# Patient Record
Sex: FEMALE | Race: BLACK OR AFRICAN AMERICAN | NOT HISPANIC OR LATINO | Employment: FULL TIME | URBAN - METROPOLITAN AREA
[De-identification: names, ages, dates, MRNs, and addresses within clinical notes are randomized per-mention and may not be internally consistent; named-entity substitution may affect disease eponyms.]

---

## 2017-10-11 ENCOUNTER — HOSPITAL ENCOUNTER (OUTPATIENT)
Dept: OBSTETRICS AND GYNECOLOGY | Facility: HOSPITAL | Age: 22
End: 2017-10-11
Attending: SPECIALIST | Admitting: SPECIALIST

## 2017-10-17 ENCOUNTER — HISTORICAL (OUTPATIENT)
Dept: LAB | Facility: HOSPITAL | Age: 22
End: 2017-10-17

## 2021-09-13 ENCOUNTER — HISTORICAL (OUTPATIENT)
Dept: ADMINISTRATIVE | Facility: HOSPITAL | Age: 26
End: 2021-09-13

## 2021-09-13 LAB
HAV IGM SERPL QL IA: NONREACTIVE
HBV CORE IGM SERPL QL IA: NONREACTIVE
HBV SURFACE AG SERPL QL IA: NONREACTIVE
HCV AB SERPL QL IA: NONREACTIVE
HIV 1+2 AB+HIV1 P24 AG SERPL QL IA: NONREACTIVE
T PALLIDUM AB SER QL: NONREACTIVE

## 2022-04-07 ENCOUNTER — HISTORICAL (OUTPATIENT)
Dept: ADMINISTRATIVE | Facility: HOSPITAL | Age: 27
End: 2022-04-07
Payer: MEDICAID

## 2022-04-23 VITALS
WEIGHT: 178.38 LBS | SYSTOLIC BLOOD PRESSURE: 127 MMHG | HEIGHT: 60 IN | BODY MASS INDEX: 35.02 KG/M2 | OXYGEN SATURATION: 100 % | DIASTOLIC BLOOD PRESSURE: 84 MMHG

## 2022-05-02 NOTE — HISTORICAL [facility]
This is a historical note converted from [employer]. Formatting and pictures may have been removed.  Please reference [employer] for original formatting and attached multimedia. Chief Complaint  1 year f/u, pap x 1 year ago  History of Present Illness  26 y.o.? presenting for an annual exam. She has a history of AUB and?cervical dysplasia as below.  Regarding AUB, reports improvement in bleeding profile. Menses are now regular lasting 5 days, heavy bleeding only for the first 2 days.  ?  Abnormal GYN cytology Hx:   ASCUS/HPV +   LSIL  2016 LSIL  2016 CINI/ ECC BENIGN   LSIL/HPV NEG  2018 ASCUS  2019: Colpo: 12:00 benign; ECC scant, insufficient  : NIL HPV negative  Review of Systems  Constitutional:?no fever  Respiratory:?no shortness of breath  Cardiovascular:?no chest pain  Gastrointestinal:?no nausea, vomiting, or diarrhea. No abdominal pain  Genitourinary:?no dysuria, no urinary frequency or urgency, no hematuria  Musculoskeletal: no muscle or joint pain, no joint swelling  Physical Exam  Vitals & Measurements  T:?36.9? ?C (Oral)? HR:?73(Peripheral)? RR:?18? BP:?127/84?  HT:?152.00?cm? WT:?80.900?kg? BMI:?35.02? LMP:?2021 00:00 CDT?  General: NAD, AAOx3. Well appearing.?  Respiratory: CTAB, no wheezes, rales, or rhonchi  Cardiovascular: RRR no murmurs, rubs, or gallops  Breast: symmetric, no skin changes, no masses palpated, no supraclavicular or axillary lymphadenopathy, nipples aligned with no discharge/blood/inversion  Abdomen: soft, nondistended, nontender to palpation, no masses palpated, normoactive bowel sounds  Incisions: well-healed Pfannenstiel incision  Extremities: no edema, no calf tenderness  External genitalia: Normal female anatomy, no masses/lesions.  Speculum exam: vaginal mucosa normal in appearance. Pink. Cervix well visualized with no masses or lesions.  Bimanual exam: Uterus Size: 8-10 cm. Minimal Capacity, descent,?and mobility. No adnexal  fullness/tenderness.  Assessment/Plan  1.?Womens annual routine gynecological examination?Z01.419  ?Follow up in one year with GYN clinic. Discuss Female Contraception.  ?  Ordered:  Chlam trachom & N gonorrhoeae by [facility] [id number], Routine collect, Cervical, 21 10:57:00 CDT, Stop date 21 10:57:00 CDT, Nurse collect, Routine screening for STI (sexually transmitted infection)  Womens annual routine gynecological examination, 21 10:57:00 CDT  Clinic Follow Up Telemed Office, *Est. 22 3:00:00 CDT, Order for future visit, Womens annual routine gynecological examination  ?  2.?Routine screening for STI (sexually transmitted infection)?Z11.3  Labs and swabs collected today  ?  Ordered:  Chlam trachom & N gonorrhoeae by [facility] [id number], Routine collect, Cervical, 21 10:57:00 CDT, Stop date 21 10:57:00 CDT, Nurse collect, Routine screening for STI (sexually transmitted infection)  Womens annual routine gynecological examination, 21 10:57:00 CDT  ?  3.?History of cervical dysplasia?Z87.410  No abnormal findings on exam. Pap smear up to date/  ?  4.?Obesity?E66.9  Counseled patient on benefits of exercise and diet. Informed patients that 30 minutes moderate intensity exercise five time a week is recommended for women.  ?  Patient seen and examined by Mane Ndiaye and Dr. Espinoza.  ?  Reviewed the patient?s medical history, resident?s findings on physical exam, and the diagnosis with treatment plan. Care provided was reasonable and necessary.  ?  Miesha Shepard MD   Problem List/Past Medical History  Ongoing  General counseling and advice on female contraception  Obesity  Screening for malignant neoplasm of cervix  Historical  Anemia  Contraception management  Procedure/Surgical History   Section (None) (2017)  Extraction of Products of Conception, Low Cervical, Open Approach (2017)   section (2013)   section (2008)    Medications  ferrous gluconate 324 mg (38 mg elemental iron) oral tablet, 324 mg= 1 tab(s), Oral, Daily  Allergies  No Known Medication Allergies  Social History  Abuse/Neglect  No, No, 09/02/2020  Alcohol  Past, 07/11/2021  Never, 08/12/2016  Employment/School  Employed, 07/12/2019  Exercise  Exercise duration: 0., 07/12/2019  Home/Environment  Lives with Children. Living situation: Home/Independent., 07/12/2019  Nutrition/Health  Regular, 07/12/2019  Sexual  Sexually active: Yes. Number of current partners 1. Sexual orientation: Straight or heterosexual. Gender Identity Identifies as female., 09/02/2020  Substance Use  Never, 07/11/2021  Never, 08/12/2016  Tobacco  Never (less than 100 in lifetime), N/A, 09/02/2020  Family History  Abnormal uterine bleeding..: Mother.  Ovarian cyst: Mother.

## 2022-06-20 ENCOUNTER — HOSPITAL ENCOUNTER (INPATIENT)
Facility: HOSPITAL | Age: 27
LOS: 3 days | Discharge: HOME | DRG: 378 | End: 2022-06-23
Attending: EMERGENCY MEDICINE | Admitting: INTERNAL MEDICINE
Payer: MEDICAID

## 2022-06-20 DIAGNOSIS — R19.5 OCCULT BLOOD POSITIVE STOOL: ICD-10-CM

## 2022-06-20 DIAGNOSIS — D64.9 SYMPTOMATIC ANEMIA: Primary | ICD-10-CM

## 2022-06-20 PROCEDURE — 11000001 HC ACUTE MED/SURG PRIVATE ROOM

## 2022-06-20 PROCEDURE — 99285 EMERGENCY DEPT VISIT HI MDM: CPT | Mod: 25

## 2022-06-20 NOTE — Clinical Note
Diagnosis: Symptomatic anemia [9028949]   Admitting Provider:: NANCY PAREDES [[id number]]   Future Attending Provider: NANCY PAREDES [[id number]]   Reason for IP Medical Treatment  (Clinical interventions that can only be accomplished in the IP setting? ) :: blood, gi   Estimated Length of Stay:: 2 midnights   I certify that Inpatient services for greater than or equal to 2 midnights are medically necessary:: Yes   Plans for Post-Acute care--if anticipated (pick the single best option):: A. No post acute care anticipated at this time

## 2022-06-21 ENCOUNTER — ANESTHESIA EVENT (OUTPATIENT)
Dept: ENDOSCOPY | Facility: HOSPITAL | Age: 27
DRG: 378 | End: 2022-06-21
Payer: MEDICAID

## 2022-06-21 ENCOUNTER — ANESTHESIA (OUTPATIENT)
Dept: ENDOSCOPY | Facility: HOSPITAL | Age: 27
DRG: 378 | End: 2022-06-21
Payer: MEDICAID

## 2022-06-21 PROBLEM — D62 ACUTE BLOOD LOSS ANEMIA: Status: ACTIVE | Noted: 2022-06-21

## 2022-06-21 LAB
ABO + RH BLD: NORMAL
ABO + RH BLD: NORMAL
ABORH RETYPE: NORMAL
B-HCG UR QL: NEGATIVE
BLD PROD TYP BPU: NORMAL
BLD PROD TYP BPU: NORMAL
BLOOD UNIT EXPIRATION DATE: NORMAL
BLOOD UNIT EXPIRATION DATE: NORMAL
BLOOD UNIT TYPE CODE: 7300
BLOOD UNIT TYPE CODE: 7300
CROSSMATCH INTERPRETATION: NORMAL
CROSSMATCH INTERPRETATION: NORMAL
CTP QC/QA: YES
DISPENSE STATUS: NORMAL
DISPENSE STATUS: NORMAL
GROUP & RH: NORMAL
HCT VFR BLD AUTO: 24.8 % (ref 37–47)
HCT VFR BLD AUTO: 33 % (ref 37–47)
HCT VFR BLD AUTO: 36.6 % (ref 37–47)
HGB BLD-MCNC: 10.9 GM/DL (ref 12–16)
HGB BLD-MCNC: 6.5 GM/DL (ref 12–16)
HGB BLD-MCNC: 9.7 GM/DL (ref 12–16)
INDIRECT COOMBS GEL: NORMAL
SARS-COV-2 RDRP RESP QL NAA+PROBE: NEGATIVE
UNIT NUMBER: NORMAL
UNIT NUMBER: NORMAL

## 2022-06-21 PROCEDURE — 25000003 PHARM REV CODE 250: Performed by: ANESTHESIOLOGY

## 2022-06-21 PROCEDURE — 37000008 HC ANESTHESIA 1ST 15 MINUTES: Performed by: INTERNAL MEDICINE

## 2022-06-21 PROCEDURE — 27201423 OPTIME MED/SURG SUP & DEVICES STERILE SUPPLY: Performed by: INTERNAL MEDICINE

## 2022-06-21 PROCEDURE — 63600175 PHARM REV CODE 636 W HCPCS: Performed by: EMERGENCY MEDICINE

## 2022-06-21 PROCEDURE — 43202 ESOPHAGOSCOPY FLEX BIOPSY: CPT | Performed by: INTERNAL MEDICINE

## 2022-06-21 PROCEDURE — 25000003 PHARM REV CODE 250: Performed by: EMERGENCY MEDICINE

## 2022-06-21 PROCEDURE — 25000003 PHARM REV CODE 250: Performed by: INTERNAL MEDICINE

## 2022-06-21 PROCEDURE — C9113 INJ PANTOPRAZOLE SODIUM, VIA: HCPCS | Performed by: EMERGENCY MEDICINE

## 2022-06-21 PROCEDURE — 36415 COLL VENOUS BLD VENIPUNCTURE: CPT | Performed by: EMERGENCY MEDICINE

## 2022-06-21 PROCEDURE — 11000001 HC ACUTE MED/SURG PRIVATE ROOM

## 2022-06-21 PROCEDURE — 86850 RBC ANTIBODY SCREEN: CPT | Performed by: EMERGENCY MEDICINE

## 2022-06-21 PROCEDURE — 63600175 PHARM REV CODE 636 W HCPCS: Performed by: NURSE ANESTHETIST, CERTIFIED REGISTERED

## 2022-06-21 PROCEDURE — 86920 COMPATIBILITY TEST SPIN: CPT | Performed by: EMERGENCY MEDICINE

## 2022-06-21 PROCEDURE — 81025 URINE PREGNANCY TEST: CPT | Performed by: ANESTHESIOLOGY

## 2022-06-21 PROCEDURE — 36430 TRANSFUSION BLD/BLD COMPNT: CPT

## 2022-06-21 PROCEDURE — 87635 SARS-COV-2 COVID-19 AMP PRB: CPT | Performed by: INTERNAL MEDICINE

## 2022-06-21 PROCEDURE — 85014 HEMATOCRIT: CPT | Performed by: EMERGENCY MEDICINE

## 2022-06-21 PROCEDURE — P9016 RBC LEUKOCYTES REDUCED: HCPCS | Performed by: EMERGENCY MEDICINE

## 2022-06-21 PROCEDURE — 37000009 HC ANESTHESIA EA ADD 15 MINS: Performed by: INTERNAL MEDICINE

## 2022-06-21 PROCEDURE — 25000003 PHARM REV CODE 250: Performed by: NURSE ANESTHETIST, CERTIFIED REGISTERED

## 2022-06-21 RX ORDER — PANTOPRAZOLE SODIUM 40 MG/10ML
INJECTION, POWDER, LYOPHILIZED, FOR SOLUTION INTRAVENOUS
Status: DISPENSED
Start: 2022-06-21 | End: 2022-06-21

## 2022-06-21 RX ORDER — ONDANSETRON 2 MG/ML
4 INJECTION INTRAMUSCULAR; INTRAVENOUS EVERY 8 HOURS PRN
Status: DISCONTINUED | OUTPATIENT
Start: 2022-06-21 | End: 2022-06-23 | Stop reason: HOSPADM

## 2022-06-21 RX ORDER — SODIUM CHLORIDE 0.9 % (FLUSH) 0.9 %
10 SYRINGE (ML) INJECTION
Status: DISCONTINUED | OUTPATIENT
Start: 2022-06-21 | End: 2022-06-23 | Stop reason: HOSPADM

## 2022-06-21 RX ORDER — SODIUM, POTASSIUM,MAG SULFATES 17.5-3.13G
177 SOLUTION, RECONSTITUTED, ORAL ORAL 2 TIMES DAILY
Status: COMPLETED | OUTPATIENT
Start: 2022-06-21 | End: 2022-06-22

## 2022-06-21 RX ORDER — TALC
6 POWDER (GRAM) TOPICAL NIGHTLY PRN
Status: DISCONTINUED | OUTPATIENT
Start: 2022-06-21 | End: 2022-06-23 | Stop reason: HOSPADM

## 2022-06-21 RX ORDER — HYDROCODONE BITARTRATE AND ACETAMINOPHEN 500; 5 MG/1; MG/1
TABLET ORAL
Status: DISCONTINUED | OUTPATIENT
Start: 2022-06-21 | End: 2022-06-23 | Stop reason: HOSPADM

## 2022-06-21 RX ORDER — LIDOCAINE HYDROCHLORIDE 10 MG/ML
1 INJECTION, SOLUTION EPIDURAL; INFILTRATION; INTRACAUDAL; PERINEURAL ONCE
Status: COMPLETED | OUTPATIENT
Start: 2022-06-21 | End: 2022-06-21

## 2022-06-21 RX ORDER — SODIUM CHLORIDE 9 MG/ML
INJECTION, SOLUTION INTRAVENOUS CONTINUOUS
Status: DISCONTINUED | OUTPATIENT
Start: 2022-06-21 | End: 2022-06-23 | Stop reason: HOSPADM

## 2022-06-21 RX ORDER — LIDOCAINE HYDROCHLORIDE 10 MG/ML
INJECTION, SOLUTION EPIDURAL; INFILTRATION; INTRACAUDAL; PERINEURAL
Status: COMPLETED
Start: 2022-06-21 | End: 2022-06-21

## 2022-06-21 RX ORDER — PROPOFOL 10 MG/ML
VIAL (ML) INTRAVENOUS
Status: DISCONTINUED | OUTPATIENT
Start: 2022-06-21 | End: 2022-06-21

## 2022-06-21 RX ORDER — PROPOFOL 10 MG/ML
VIAL (ML) INTRAVENOUS
Status: COMPLETED
Start: 2022-06-21 | End: 2022-06-21

## 2022-06-21 RX ORDER — PANTOPRAZOLE SODIUM 40 MG/10ML
40 INJECTION, POWDER, LYOPHILIZED, FOR SOLUTION INTRAVENOUS
Status: COMPLETED | OUTPATIENT
Start: 2022-06-21 | End: 2022-06-21

## 2022-06-21 RX ADMIN — PROPOFOL 50 MG: 10 INJECTION, EMULSION INTRAVENOUS at 12:06

## 2022-06-21 RX ADMIN — LIDOCAINE HYDROCHLORIDE 5 ML: 10 INJECTION, SOLUTION EPIDURAL; INFILTRATION; INTRACAUDAL; PERINEURAL at 12:06

## 2022-06-21 RX ADMIN — PROPOFOL 150 MG: 10 INJECTION, EMULSION INTRAVENOUS at 12:06

## 2022-06-21 RX ADMIN — SODIUM CHLORIDE: 9 INJECTION, SOLUTION INTRAVENOUS at 09:06

## 2022-06-21 RX ADMIN — SODIUM SULFATE, POTASSIUM SULFATE, MAGNESIUM SULFATE 177 ML: 17.5; 3.13; 1.6 SOLUTION, CONCENTRATE ORAL at 06:06

## 2022-06-21 RX ADMIN — PANTOPRAZOLE SODIUM 40 MG: 40 INJECTION, POWDER, LYOPHILIZED, FOR SOLUTION INTRAVENOUS at 03:06

## 2022-06-21 RX ADMIN — SODIUM CHLORIDE, SODIUM GLUCONATE, SODIUM ACETATE, POTASSIUM CHLORIDE AND MAGNESIUM CHLORIDE: 526; 502; 368; 37; 30 INJECTION, SOLUTION INTRAVENOUS at 12:06

## 2022-06-21 NOTE — H&P
[facility] - Emergency Dept    History & Physical      Patient Name: Alexa Gonzales  MRN: [id number]  Admission Date: 6/20/2022  Attending Physician: Vladimir Bermudez MD   Primary Care Provider: JOSE Tolliver         Patient information was obtained from patient and ER records.     Subjective:     Principal Problem: Low hemoglobin.    Chief Complaint:   Chief Complaint   Patient presents with    GI Problem     Pt arrives via [facility], pt is transfer from [facility], Pt has a GI bleed.         HPI: This is a patient that is followed by nurse practitioner Iesha Thomas. Patient is now rates to me that this past Saturday, June 18 she was contacted via telephone by her nurse practitioner advising her that her hemoglobin was 6 that she should immediately go to the emergency room at [facility].  The patient did not follow her instructions until last night because she told me that she had things to do.  At [facility] the OR and GI sweets are closed due to equipment failure and replacement and the patient was transferred to [facility].  Patient tells me that she has been anemic for a very long time, years.  She has never had a GI workup.  And she states that she does not have heavy periods.  She states that she has had blood transfusions in the past ×2 events.  Patient does not feel weak or faint C.  No use of NSAIDs or any other type of blood thinners.  Patient states that she had a CAT scan of the abdomen and pelvis at the emergency room at [facility] and she was told that those results were normal.  Plan he was to transfuse her primary Consul GI and see if they want to work her up as an outpatient or proceed with scopes. Of importance to note is that patient is covered in tattoos.    Past Medical History:   Diagnosis Date    Anemia, unspecified        History reviewed. No pertinent surgical history.    Review of patient's allergies indicates:  No Known Allergies    No  current facility-administered medications on file prior to encounter.     No current outpatient medications on file prior to encounter.     Family History    None       Tobacco Use    Smoking status: Not on file    Smokeless tobacco: Not on file   Substance and Sexual Activity    Alcohol use: Not on file    Drug use: Not on file    Sexual activity: Not on file     Review of Systems   Constitutional: Negative.    HENT: Negative.    Eyes: Negative.    Respiratory: Negative.    Cardiovascular: Negative.    Gastrointestinal: Positive for blood in stool.   Endocrine: Negative.    Genitourinary: Negative.    Musculoskeletal: Negative.    Skin: Negative.    Allergic/Immunologic: Negative.    Neurological: Negative.    Hematological: Negative.    Psychiatric/Behavioral: Negative.    All other systems reviewed and are negative.    Objective:     Vital Signs (Most Recent):  Temp: 98.4 °F (36.9 °C) (06/21/22 0739)  Pulse: (!) 59 (06/21/22 0739)  Resp: (!) 22 (06/21/22 0739)  BP: 130/77 (06/21/22 0739)  SpO2: 100 % (06/21/22 0739) Vital Signs (24h Range):  Temp:  [98 °F (36.7 °C)-98.6 °F (37 °C)] 98.4 °F (36.9 °C)  Pulse:  [58-91] 59  Resp:  [13-24] 22  SpO2:  [98 %-100 %] 100 %  BP: (103-144)/(60-84) 130/77     Weight: 77.1 kg (170 lb)  Body mass index is 33.2 kg/m².    Physical Exam  Vitals and nursing note reviewed.   Constitutional:       Appearance: Normal appearance. She is obese.   HENT:      Head: Normocephalic and atraumatic.      Right Ear: External ear normal.      Left Ear: External ear normal.      Nose: Nose normal.      Mouth/Throat:      Mouth: Mucous membranes are moist.      Pharynx: Oropharynx is clear.   Eyes:      Extraocular Movements: Extraocular movements intact.      Conjunctiva/sclera: Conjunctivae normal.      Pupils: Pupils are equal, round, and reactive to light.   Cardiovascular:      Rate and Rhythm: Normal rate and regular rhythm.      Pulses: Normal pulses.      Heart sounds: Normal heart  sounds.   Pulmonary:      Effort: Pulmonary effort is normal.      Breath sounds: Normal breath sounds.   Abdominal:      General: Abdomen is flat. Bowel sounds are normal.      Palpations: Abdomen is soft.   Musculoskeletal:         General: Normal range of motion.      Cervical back: Normal range of motion and neck supple.   Skin:     General: Skin is warm and dry.   Neurological:      General: No focal deficit present.      Mental Status: She is alert and oriented to person, place, and time.   Psychiatric:         Mood and Affect: Mood normal.         Behavior: Behavior normal.         Thought Content: Thought content normal.           CRANIAL NERVES     CN III, IV, VI   Pupils are equal, round, and reactive to light.      Significant Labs:   All pertinent labs within the past 24 hours have been reviewed.  CBC:   Recent Labs   Lab 06/21/22  0037   HGB 6.5*   HCT 24.8*       Significant Imaging: I have reviewed all pertinent imaging results/findings within the past 24 hours.  CT: I have reviewed all pertinent results/findings within the past 24 hours and my personal findings are:  not available for me to review at the moment.    Assessment/Plan:     There are no hospital problems to display for this patient.    VTE Risk Mitigation (From admission, onward)         Ordered     IP VTE HIGH RISK PATIENT  Once         06/21/22 0411     Place sequential compression device  Until discontinued         06/21/22 0411            Assessment is patient with history of chronic anemia for a long time which at the moment I cannot determine since the patient states has just been a long time.  She is basically asymptomatic.  The sense of urgency, Chasidy by the primary care nurse practitioner is not reflected in the patient's behavior and demeanor.  She has not had any type of workup.  She states that she is awaiting a GI referral.  I will see we can coordinate some type of complete workup on this patient.  I will consult with GI to  see what a dural recommendations are.  She will be transfused with a couple of units of blood.  We could do some iron studies but it seems like she has chronic GI bleed could be from an AVM which may necessitate bleeding scan or camera study.      Vladimir Bermudez MD  Department of Hospital Medicine   [facility] - Emergency Dept

## 2022-06-21 NOTE — PROGRESS NOTES
Attempted to meet with patient to conduct initial cm dc planning assessment but patient in GI lab.   no

## 2022-06-21 NOTE — CONSULTS
"Gastroenterology Consult    Reason for Consult:     Acute on chronic anemia, melena    Hospital Day:0     HPI:  Alexa Gonzales is a 27 y.o. female unknown to our group w/ pmhx of chronic anemia presenting to the ED as transfer from [facility], directed by her PCP Iesha Thomas NP for low H&H on outpatient labs. Hgb is currently 6.5. Pt went to [facility] ED last night where CT abd/pelv w/ contrast was done 6/20/22 and was reportedly unremarkable. She was transferred here for GI workup. Pt reports a hx of chronic anemia managed by her PCP with iron supplementation. Reports significant blood clots with her menstrual cycles, but she doesn't describe them as "heavy." Her stool is occasionally darker in color, but usually brown. No NSAIDs, anticoags, or alcohol. She reports that she only has BMs 2-3 x/mo and sometimes strains when having them, causing rectal pain and scant blood on the toilet paper but she denies significant hematochezia. No recent weight loss, n/v/d, dysphagia, abd pain, or other GI complaints.    abd surgeries include only c-sections. Naive to endoscopy. Family hx positive for colon and esophageal cancer in her grandparent. No first degree relatives with known GI cancer/disease.     VSS, Lab work reviewed from [facility] trans and are unremarkable. She is receiving 1 unit of prbcs now.      PCP:  JOSE Tolliver    Review of patient's allergies indicates:  No Known Allergies    Current Facility-Administered Medications   Medication Dose Route Frequency Provider Last Rate Last Admin    pantoprazole (PROTONIX) 40 mg injection             0.9%  NaCl infusion (for blood administration)   Intravenous Q24H PRN Aye Rand MD        0.9%  NaCl infusion   Intravenous Continuous Aye Rand MD        melatonin tablet 6 mg  6 mg Oral Nightly PRN Aye Rand MD        ondansetron injection 4 mg  4 mg Intravenous Q8H PRN Aye Rand MD        sodium chloride 0.9% flush 10 mL  10 mL Intravenous " NELSON Rand MD         No current outpatient medications on file.     (Not in a hospital admission)      Past Medical History:  Past Medical History:   Diagnosis Date    Anemia, unspecified        Past Surgical History:  History reviewed. No pertinent surgical history.    Family History:  History reviewed. No pertinent family history.    Social History:  Social History     Tobacco Use    Smoking status: Not on file    Smokeless tobacco: Not on file   Substance Use Topics    Alcohol use: Not on file           Review of Systems:    Review of Systems   Constitutional: Negative for fever and unexpected weight change.   Respiratory: Negative for cough and shortness of breath.    Cardiovascular: Negative for chest pain and leg swelling.   Gastrointestinal: Positive for anal bleeding, blood in stool and constipation. Negative for abdominal pain, diarrhea, nausea and vomiting.   Musculoskeletal: Negative for back pain and myalgias.   Skin: Negative for color change and pallor.   Neurological: Negative for speech difficulty and weakness.   All other systems reviewed and are negative.      Objective:      VITAL SIGNS: 24 HR MIN & MAX LAST  Temp  Min: 98 °F (36.7 °C)  Max: 98.6 °F (37 °C) 98.4 °F (36.9 °C)  BP  Min: 103/83  Max: 144/84 121/85  Pulse  Min: 58  Max: 91 60  Resp  Min: 13  Max: 25 (!) 25  SpO2  Min: 98 %  Max: 100 % 100 %      Intake/Output Summary (Last 24 hours) at 6/21/2022 0852  Last data filed at 6/21/2022 0724  Gross per 24 hour   Intake 600 ml   Output --   Net 600 ml       Physical Exam  Vitals and nursing note reviewed.   Constitutional:       Appearance: Normal appearance.   HENT:      Head: Normocephalic and atraumatic.   Eyes:      General: No scleral icterus.     Extraocular Movements: Extraocular movements intact.   Cardiovascular:      Rate and Rhythm: Normal rate and regular rhythm.      Heart sounds: Normal heart sounds.   Pulmonary:      Effort: Pulmonary effort is normal. No  respiratory distress.      Breath sounds: Normal breath sounds.   Abdominal:      General: Abdomen is flat. Bowel sounds are normal. There is no distension.      Palpations: Abdomen is soft.      Tenderness: There is no abdominal tenderness.   Musculoskeletal:         General: No swelling or deformity. Normal range of motion.      Right lower leg: No edema.      Left lower leg: No edema.   Skin:     General: Skin is warm and dry.   Neurological:      General: No focal deficit present.      Mental Status: She is alert and oriented to person, place, and time.   Psychiatric:         Mood and Affect: Mood normal.         Behavior: Behavior normal.         Recent Results (from the past 48 hour(s))   Type & Screen    Collection Time: 06/21/22 12:37 AM   Result Value Ref Range    Group & Rh B POS     Indirect Sincere GEL NEG    Hemoglobin and Hematocrit    Collection Time: 06/21/22 12:37 AM   Result Value Ref Range    Hgb 6.5 (L) 12.0 - 16.0 gm/dL    Hct 24.8 (L) 37.0 - 47.0 %   Prepare RBC 2 Units; symptomatic anemia    Collection Time: 06/21/22 12:37 AM   Result Value Ref Range    UNIT NUMBER [id number]     UNIT ABO/RH B POS     DISPENSE STATUS Issued     Unit Expiration [id number]     Product Code F7651T97     Unit Blood Type Code 7300     CROSSMATCH INTERPRETATION Compatible     UNIT NUMBER [id number]     UNIT ABO/RH B POS     DISPENSE STATUS Issued     Unit Expiration [id number]     Product Code W8034S80     Unit Blood Type Code 7300     CROSSMATCH INTERPRETATION Compatible    ABORH RETYPE    Collection Time: 06/21/22  2:20 AM   Result Value Ref Range    ABORH Retype B POS        No results found.      Assessment & Plan:  28 y/o female unknown to our group with pmhx of chronic anemia admitted with low H&H and reports of dark stool.    1. Acute on chronic anemia  - long hx of chronic anemia requiring transfusions. Questionably heavy menstrual cycles.  - on iron supplementation.  - hgb 6.5 --- receiving 1 unit  prbcs now.     2. Melena?  - unsure if the dark stool is due to iron supplementation  - no associated pain, NSAID use   - obtain FOBT    -EGD today. Afterwards, clear liquids for the rest of the day, prep tonight and probable colonoscopy tomorrow.    Thank you for allowing us to participate in this patient's care.    Agueda Staples PA-C  [facility], [facility]

## 2022-06-21 NOTE — INTERVAL H&P NOTE
The patient has been examined and the H&P  has not changed since consultation.    LILA CAMPO          Active Hospital Problems    Diagnosis  POA    *Acute blood loss anemia [D62]  Unknown      Resolved Hospital Problems   No resolved problems to display.

## 2022-06-21 NOTE — PROGRESS NOTES
Gastroenterology Progress Note    Subjective/Interval History:  Iron deficiency anemia of unknown etiology    ROS:    ROS      Vital Signs:  /88   Pulse 65   Temp 98.4 °F (36.9 °C)   Resp 18   Ht 5' (1.524 m)   Wt 77.1 kg (170 lb)   SpO2 100%   BMI 33.20 kg/m²   Body mass index is 33.2 kg/m².    Physical Exam:    Physical Exam    Labs:  Recent Results (from the past 48 hour(s))   Type & Screen    Collection Time: 06/21/22 12:37 AM   Result Value Ref Range    Group & Rh B POS     Indirect Sincere GEL NEG    Hemoglobin and Hematocrit    Collection Time: 06/21/22 12:37 AM   Result Value Ref Range    Hgb 6.5 (L) 12.0 - 16.0 gm/dL    Hct 24.8 (L) 37.0 - 47.0 %   Prepare RBC 2 Units; symptomatic anemia    Collection Time: 06/21/22 12:37 AM   Result Value Ref Range    UNIT NUMBER [id number]     UNIT ABO/RH B POS     DISPENSE STATUS Issued     Unit Expiration [id number]     Product Code B9130E87     Unit Blood Type Code 7300     CROSSMATCH INTERPRETATION Compatible     UNIT NUMBER [id number]     UNIT ABO/RH B POS     DISPENSE STATUS Issued     Unit Expiration [id number]     Product Code [id number]     Unit Blood Type Code 7300     CROSSMATCH INTERPRETATION Compatible    ABORH RETYPE    Collection Time: 06/21/22  2:20 AM   Result Value Ref Range    ABORH Retype B POS    COVID-19 Rapid Screening    Collection Time: 06/21/22  9:51 AM   Result Value Ref Range    SARS COV-2 MOLECULAR Negative Negative   POCT urine pregnancy    Collection Time: 06/21/22 12:11 PM   Result Value Ref Range    POC Preg Test, Ur Negative Negative     Acceptable Yes          Assessment/Plan:  EGD today revealed no source of the blood loss GI wise.  Patient will undergo colonoscopy tomorrow with family history of colon cancer unexplained blood loss anemia.  The possibility still exists that this could be combination of childbirths over the last 5 years and menstrual losses.  She however has been on Depo-Provera and has  had very few periods capsule endoscopy will also be considered.       TRACEY Kuhn

## 2022-06-21 NOTE — ANESTHESIA POSTPROCEDURE EVALUATION
Anesthesia Post Evaluation    Patient: Alexa Gonzales    Procedure(s) Performed: Procedure(s) (LRB):  EGD (ESOPHAGOGASTRODUODENOSCOPY) (N/A)  EGD, WITH CLOSED BIOPSY    Final Anesthesia Type: MAC      Patient location during evaluation: GI PACU  Patient participation: Yes- Able to Participate  Level of consciousness: awake and alert  Post-procedure vital signs: reviewed and stable  Pain management: adequate  Airway patency: patent    PONV status at discharge: No PONV  Anesthetic complications: no      Cardiovascular status: blood pressure returned to baseline  Respiratory status: spontaneous ventilation and unassisted  Hydration status: euvolemic  Follow-up not needed.          Vitals Value Taken Time   /88 06/21/22 1248   Temp 98 06/21/22 1353   Pulse 65 06/21/22 1248   Resp 18 06/21/22 1248   SpO2 100 % 06/21/22 1248         No case tracking events are documented in the log.      Pain/Daniel Score: Daniel Score: 10 (6/21/2022 12:48 PM)

## 2022-06-21 NOTE — H&P (VIEW-ONLY)
"Gastroenterology Consult    Reason for Consult:     Acute on chronic anemia, melena    Hospital Day:0     HPI:  Alexa Gonzales is a 27 y.o. female unknown to our group w/ pmhx of chronic anemia presenting to the ED [place] [facility] [place] directed by her PCP Iesha Thomas NP for low H&H on outpatient labs. Hgb is currently 6.5. Pt went [place] [facility] [place] where CT abd/pelv w/ contrast was done 6/20/22 and was reportedly unremarkable. She was transferred here for GI workup. Pt reports a hx of chronic anemia managed by her PCP with iron supplementation. Reports significant blood clots with her menstrual cycles, but she doesn't describe them as "heavy." Her stool is occasionally darker in color, but usually brown. No NSAIDs, anticoags, or alcohol. She reports that she only has BMs 2-3 x/mo and sometimes strains when having them, causing rectal pain and scant blood on the toilet paper but she denies significant hematochezia. No recent weight loss, n/v/d, dysphagia, abd pain, or other GI complaints.    abd surgeries include only c-sections. Naive to endoscopy. Family hx positive for colon and esophageal cancer in her grandparent. No first degree relatives with known GI cancer/disease.     VSS, Lab work reviewed from [facility] [place] and are unremarkable. She is receiving 1 unit of prbcs now.      PCP:  JOSE Tolliver    Review of patient's allergies indicates:  No Known Allergies    Current Facility-Administered Medications   Medication Dose Route Frequency Provider Last Rate Last Admin    pantoprazole (PROTONIX) 40 mg injection             0.9%  NaCl infusion (for blood administration)   Intravenous Q24H PRN Aye Rand MD        0.9%  NaCl infusion   Intravenous Continuous Aye Rand MD        melatonin tablet 6 mg  6 mg Oral Nightly PRN Aye Rand MD        ondansetron injection 4 mg  4 mg Intravenous Q8H PRN Aye Rand MD        sodium chloride 0.9% flush 10 mL  10 mL Intravenous " NELSON Rand MD         No current outpatient medications on file.     (Not in a hospital admission)      Past Medical History:  Past Medical History:   Diagnosis Date    Anemia, unspecified        Past Surgical History:  History reviewed. No pertinent surgical history.    Family History:  History reviewed. No pertinent family history.    Social History:  Social History     Tobacco Use    Smoking status: Not on file    Smokeless tobacco: Not on file   Substance Use Topics    Alcohol use: Not on file           Review of Systems:    Review of Systems   Constitutional: Negative for fever and unexpected weight change.   Respiratory: Negative for cough and shortness of breath.    Cardiovascular: Negative for chest pain and leg swelling.   Gastrointestinal: Positive for anal bleeding, blood in stool and constipation. Negative for abdominal pain, diarrhea, nausea and vomiting.   Musculoskeletal: Negative for back pain and myalgias.   Skin: Negative for color change and pallor.   Neurological: Negative for speech difficulty and weakness.   All other systems reviewed and are negative.      Objective:      VITAL SIGNS: 24 HR MIN & MAX LAST  Temp  Min: 98 °F (36.7 °C)  Max: 98.6 °F (37 °C) 98.4 °F (36.9 °C)  BP  Min: 103/83  Max: 144/84 121/85  Pulse  Min: 58  Max: 91 60  Resp  Min: 13  Max: 25 (!) 25  SpO2  Min: 98 %  Max: 100 % 100 %      Intake/Output Summary (Last 24 hours) at 6/21/2022 0852  Last data filed at 6/21/2022 0724  Gross per 24 hour   Intake 600 ml   Output --   Net 600 ml       Physical Exam  Vitals and nursing note reviewed.   Constitutional:       Appearance: Normal appearance.   HENT:      Head: Normocephalic and atraumatic.   Eyes:      General: No scleral icterus.     Extraocular Movements: Extraocular movements intact.   Cardiovascular:      Rate and Rhythm: Normal rate and regular rhythm.      Heart sounds: Normal heart sounds.   Pulmonary:      Effort: Pulmonary effort is normal. No  respiratory distress.      Breath sounds: Normal breath sounds.   Abdominal:      General: Abdomen is flat. Bowel sounds are normal. There is no distension.      Palpations: Abdomen is soft.      Tenderness: There is no abdominal tenderness.   Musculoskeletal:         General: No swelling or deformity. Normal range of motion.      Right lower leg: No edema.      Left lower leg: No edema.   Skin:     General: Skin is warm and dry.   Neurological:      General: No focal deficit present.      Mental Status: She is alert and oriented to person, place, and time.   Psychiatric:         Mood and Affect: Mood normal.         Behavior: Behavior normal.         Recent Results (from the past 48 hour(s))   Type & Screen    Collection Time: 06/21/22 12:37 AM   Result Value Ref Range    Group & Rh B POS     Indirect Sincere GEL NEG    Hemoglobin and Hematocrit    Collection Time: 06/21/22 12:37 AM   Result Value Ref Range    Hgb 6.5 (L) 12.0 - 16.0 gm/dL    Hct 24.8 (L) 37.0 - 47.0 %   Prepare RBC 2 Units; symptomatic anemia    Collection Time: 06/21/22 12:37 AM   Result Value Ref Range    UNIT NUMBER [id number]     UNIT ABO/RH B POS     DISPENSE STATUS Issued     Unit Expiration [id number]     Product Code T1553C06     Unit Blood Type Code 7300     CROSSMATCH INTERPRETATION Compatible     UNIT NUMBER [id number]     UNIT ABO/RH B POS     DISPENSE STATUS Issued     Unit Expiration [id number]     Product Code [id number]     Unit Blood Type Code 7300     CROSSMATCH INTERPRETATION Compatible    ABORH RETYPE    Collection Time: 06/21/22  2:20 AM   Result Value Ref Range    ABORH Retype B POS        No results found.      Assessment & Plan:  26 y/o female unknown to our group with pmhx of chronic anemia admitted with low H&H and reports of dark stool.    1. Acute on chronic anemia  - long hx of chronic anemia requiring transfusions. Questionably heavy menstrual cycles.  - on iron supplementation.  - hgb 6.5 --- receiving 1 unit  prbcs now.     2. Melena?  - unsure if the dark stool is due to iron supplementation  - no associated pain, NSAID use   - obtain FOBT    -EGD today. Afterwards, clear liquids for the rest of the day, prep tonight and probable colonoscopy tomorrow.    Thank you for allowing us to participate in this patient's care.    Agueda Staples PA-C  [facility], [facility]

## 2022-06-21 NOTE — ED PROVIDER NOTES
Encounter Date: 6/20/2022    SCRIBE #1 NOTE: I, Carrie Blake, am scribing for, and in the presence of,  Aye Rand MD. I have scribed the following portions of the note - Other sections scribed: HPI, ROS, PE.       History     Chief Complaint   Patient presents with    GI Problem     Pt arrives via [facility], pt is transfer from [facility], Pt has a GI bleed.      27 year old female with a hx of anemia presents to the ED via EMS as a transfer from [facility] for a low hemoglobin. The patient reports that she noticed blood when wiping after bowel movement recently, however before that her stool was dark and she is constipated. She denies this ever happening before, and says that when she had a blood transfusion a year ago, she did not know why she was anemic. She denies nausea, vomiting, abdominal pain, or heavy periods, but does report lightheadedness and generalized weakness. She has never had an endoscopy or colonoscopy.    The history is provided by the patient. No  was used.   Illness   The problem occurs occasionally. The problem has been unchanged. Associated symptoms include constipation. Pertinent negatives include no fever, no abdominal pain, no diarrhea, no nausea, no vomiting, no congestion, no headaches, no sore throat, no cough, no shortness of breath and no rash. Services received include tests performed and medications given. Recently, medical care has been given at another facility.     Review of patient's allergies indicates:  No Known Allergies  Past Medical History:   Diagnosis Date    Anemia, unspecified      History reviewed. No pertinent surgical history.  History reviewed. No pertinent family history.     Review of Systems   Constitutional: Negative for chills, diaphoresis and fever.        Weakness    HENT: Negative for congestion and sore throat.    Eyes: Negative for visual disturbance.   Respiratory: Negative for cough and shortness of breath.    Cardiovascular:  Negative for chest pain and palpitations.   Gastrointestinal: Positive for blood in stool and constipation. Negative for abdominal pain, diarrhea, nausea and vomiting.   Genitourinary: Negative for dysuria and hematuria.   Skin: Negative for rash.   Neurological: Positive for light-headedness. Negative for syncope, weakness, numbness and headaches.   All other systems reviewed and are negative.      Physical Exam     Initial Vitals   BP Pulse Resp Temp SpO2   06/20/22 2339 06/20/22 2339 06/20/22 2339 06/21/22 0005 06/20/22 2339   111/60 60 16 98.6 °F (37 °C) 99 %      MAP       --                Physical Exam    Nursing note and vitals reviewed.  Constitutional: She appears well-developed and well-nourished. She is not diaphoretic. She does not appear ill. No distress.   HENT:   Head: Normocephalic and atraumatic.   Right Ear: External ear normal.   Left Ear: External ear normal.   Mouth/Throat: Oropharynx is clear and moist.   Eyes: EOM are normal. Pupils are equal, round, and reactive to light.   Pale conjunctiva and sclera    Neck: Neck supple. No tracheal deviation present.   Cardiovascular: Normal rate, regular rhythm, normal heart sounds and intact distal pulses.   No murmur heard.  Pulmonary/Chest: Breath sounds normal. No respiratory distress. She has no wheezes. She has no rhonchi. She has no rales.   Abdominal: Abdomen is soft. Bowel sounds are normal. She exhibits no distension. There is no abdominal tenderness.   No right CVA tenderness.  No left CVA tenderness.   Musculoskeletal:         General: Normal range of motion.      Cervical back: Neck supple.     Neurological: She is alert and oriented to person, place, and time. She has normal strength. No cranial nerve deficit or sensory deficit. GCS score is 15. GCS eye subscore is 4. GCS verbal subscore is 5. GCS motor subscore is 6.   Skin: Skin is warm and dry. Capillary refill takes less than 2 seconds. No rash noted. No pallor.   Psychiatric: She has a  normal mood and affect. Her behavior is normal.         ED Course   Procedures  Labs Reviewed   HEMOGLOBIN AND HEMATOCRIT, BLOOD - Abnormal; Notable for the following components:       Result Value    Hgb 6.5 (*)     Hct 24.8 (*)     All other components within normal limits   TYPE & SCREEN   ABORH RETYPE   PREPARE RBC SOFT          Imaging Results    None          Medications   0.9%  NaCl infusion (for blood administration) (has no administration in time range)   pantoprazole injection 40 mg (40 mg Intravenous Given 6/21/22 0312)     Medical Decision Making:   History:   Old Records Summarized: records from another hospital.       <> Summary of Records: Hgb 6.6 today, normal CT abdomen with contrast   Initial Assessment:   26 yo female transferred from [facility] for anemia and occult positive stool.   Clinical Tests:   Lab Tests: Ordered and Reviewed       <> Summary of Lab: Anemia   Radiological Study: Reviewed  ED Management:  Transfused 2 units   Given Protonix IV  Admitted to Dr Bermudez  GI consult placed   Other:   I have discussed this case with another health care provider.          Scribe Attestation:   Scribe #1: I performed the above scribed service and the documentation accurately describes the services I performed. I attest to the accuracy of the note.    Attending Attestation:           Physician Attestation for Scribe:  Physician Attestation Statement for Scribe #1: I, reviewed documentation, as scribed by Carrie Blake in my presence, and it is both accurate and complete.             ED Course as of 06/21/22 0355   Tue Jun 21, 2022   0158 Hemoglobin(!): 6.5  Transfusing 2 units  [KM]   0158 Results reviewed from [facility]- occult positive stool with Hgb 6.6, CT scan sent but no report, will attempt to retrieve report [KM]   [phone] Paged Dr. Bermudez [MM]   [phone] Spoke with Dr Bermudez for admission  [KM]      ED Course User Index  [KM] Aye Rand MD  [MM] Carrie Blake             Clinical Impression:   Final  diagnoses:  [D64.9] Symptomatic anemia (Primary)  [R19.5] Occult blood positive stool          ED Disposition Condition    Admit               Aye Rand MD  06/21/22 4744

## 2022-06-21 NOTE — TRANSFER OF CARE
Anesthesia Transfer of Care Note    Patient: Alexa Gonzales    Procedure(s) Performed: Procedure(s) (LRB):  EGD (ESOPHAGOGASTRODUODENOSCOPY) (N/A)  EGD, WITH CLOSED BIOPSY    Patient location: GI    Anesthesia Type: MAC    Transport from OR: Transported from OR on room air with adequate spontaneous ventilation    Post pain: adequate analgesia    Post assessment: no apparent anesthetic complications    Post vital signs: stable    Level of consciousness: awake, alert and oriented    Nausea/Vomiting: no nausea/vomiting    Complications: none    Transfer of care protocol was followed      Last vitals:   Visit Vitals  BP (!) 140/84   Pulse 61   Temp 36.9 °C (98.4 °F)   Resp 20   Ht 5' (1.524 m)   Wt 77.1 kg (170 lb)   SpO2 100%   BMI 33.20 kg/m²

## 2022-06-21 NOTE — OP NOTE
EGD Report    Referring Bonilla. Iesha Thomas [facility]    Surgeon: TRACEY Kuhn    ASA:  3    Medications: Per anesthesia    Indication:  Melena    Procedure: EGD and biopsy    Description of the Procedure: The patient was brought back to the endoscopy suite where the risks, benefits, and alternatives of the procedure were described in detail. The patient was given the opportunity to ask questions and then signed informed consent. Patient was positioned in the left lateral decubitus position, continuous monitoring was initiated, and supplemental oxygen was provided via nasal cannula. Bite block was placed. Adequate sedation was achieved with the above mentioned medications as documented in chart and then titrated during the entire procedure. Under direct visualization the gastroscope was introduced through the oropharynx into the esophagus. The scope was advanced into the stomach and to the second portion of the duodenum. Scope was withdrawn and the mucosa was carefully examined. The entire gastric mucosa was examined, including the fundus with retroflexion. Air was evacuated from the stomach and the scope was withdrawn into the esophagus. The entire esophageal mucosa was examined. The procedure was completed. The patient tolerated the procedure well and was transferred to the recovery area in stable condition.     Estimated Blood Loss: minimal    Complications: none    Findings:  Normal appearing upper mid and lower esophagus.  2 cm hiatal hernia.  The vascular and mucosal pattern of the body cardia fundus antrum pylorus were visualized including a retroflexed view and were unremarkable.  There is no blood in the stomach.  The duodenal bulb was unremarkable the 1st portion of the duodenum had some Brunner gland hypertrophy with 1 area of erosion. This was biopsied.  The 2nd and 3rd portions were visualized and were normal.  There was no blood in the duodenum noted.  The patient tolerated procedure quite  well.  Impression and Recommendations:   Resume diet and activities and medications.  Monitor for another day to assure hemodynamic stability.    RTACEY Kuhn

## 2022-06-21 NOTE — ANESTHESIA PREPROCEDURE EVALUATION
06/21/2022  Alexa Gonzales is a 27 y.o., female.      Pre-op Assessment    I have reviewed the Patient Summary Reports.     I have reviewed the Nursing Notes. I have reviewed the NPO Status.   I have reviewed the Medications.     Review of Systems  Anesthesia Hx:  No problems with previous Anesthesia    Social:  Non-Smoker    Cardiovascular:   Denies Hypertension.  Denies MI.    Denies Angina.  Denies CHF.    Pulmonary:   Denies COPD.  Denies Asthma.    Renal/:   Denies Chronic Renal Disease. no renal calculi     Hepatic/GI:   Denies GERD. Denies Liver Disease.  Denies Hepatitis.    Neurological:   Denies CVA. Denies Seizures.    Endocrine:   Denies Diabetes. Denies Hypothyroidism. Denies Hyperthyroidism.  Obesity / BMI > 30      Physical Exam  General: Well nourished, Cooperative, Alert and Oriented    Airway:  Mallampati: I   Mouth Opening: Normal  TM Distance: Normal  Tongue: Normal  Neck ROM: Normal ROM    Dental:  Intact        Anesthesia Plan  Type of Anesthesia, risks & benefits discussed:    Anesthesia Type: MAC  Intra-op Monitoring Plan: Standard ASA Monitors  Induction:  IV  Informed Consent: Informed consent signed with the Patient and all parties understand the risks and agree with anesthesia plan.  All questions answered.   ASA Score: 2  Day of Surgery Review of History & Physical: H&P Update referred to the surgeon/provider.    Ready For Surgery From Anesthesia Perspective.     .

## 2022-06-22 ENCOUNTER — ANESTHESIA EVENT (OUTPATIENT)
Dept: ENDOSCOPY | Facility: HOSPITAL | Age: 27
DRG: 378 | End: 2022-06-22
Payer: MEDICAID

## 2022-06-22 ENCOUNTER — ANESTHESIA (OUTPATIENT)
Dept: ENDOSCOPY | Facility: HOSPITAL | Age: 27
DRG: 378 | End: 2022-06-22
Payer: MEDICAID

## 2022-06-22 PROBLEM — D64.9 SYMPTOMATIC ANEMIA: Status: ACTIVE | Noted: 2022-06-22

## 2022-06-22 PROBLEM — R19.5 OCCULT BLOOD POSITIVE STOOL: Status: ACTIVE | Noted: 2022-06-22

## 2022-06-22 LAB
FERRITIN SERPL-MCNC: 94.07 NG/ML (ref 4.63–204)
FOLATE SERPL-MCNC: 13 NG/ML (ref 7–31.4)
HCT VFR BLD AUTO: 35.9 % (ref 37–47)
HCT VFR BLD AUTO: 36.3 % (ref 37–47)
HGB BLD-MCNC: 10.3 GM/DL (ref 12–16)
HGB BLD-MCNC: 10.4 GM/DL (ref 12–16)
IRON SATN MFR SERPL: 17 % (ref 20–50)
IRON SERPL-MCNC: 63 UG/DL (ref 50–170)
TIBC SERPL-MCNC: 308 UG/DL (ref 70–310)
TIBC SERPL-MCNC: 371 UG/DL (ref 250–450)
TRANSFERRIN SERPL-MCNC: 350 MG/DL (ref 180–382)
VIT B12 SERPL-MCNC: 257 PG/ML (ref 213–816)

## 2022-06-22 PROCEDURE — 11000001 HC ACUTE MED/SURG PRIVATE ROOM

## 2022-06-22 PROCEDURE — 82728 ASSAY OF FERRITIN: CPT | Performed by: PHYSICIAN ASSISTANT

## 2022-06-22 PROCEDURE — 82746 ASSAY OF FOLIC ACID SERUM: CPT | Performed by: PHYSICIAN ASSISTANT

## 2022-06-22 PROCEDURE — 36415 COLL VENOUS BLD VENIPUNCTURE: CPT | Performed by: PHYSICIAN ASSISTANT

## 2022-06-22 PROCEDURE — 91110 GI TRC IMG INTRAL ESOPH-ILE: CPT | Performed by: INTERNAL MEDICINE

## 2022-06-22 PROCEDURE — 85014 HEMATOCRIT: CPT | Performed by: EMERGENCY MEDICINE

## 2022-06-22 PROCEDURE — 36415 COLL VENOUS BLD VENIPUNCTURE: CPT | Performed by: EMERGENCY MEDICINE

## 2022-06-22 PROCEDURE — 37000008 HC ANESTHESIA 1ST 15 MINUTES: Performed by: INTERNAL MEDICINE

## 2022-06-22 PROCEDURE — 25000003 PHARM REV CODE 250: Performed by: INTERNAL MEDICINE

## 2022-06-22 PROCEDURE — 25000003 PHARM REV CODE 250: Performed by: NURSE ANESTHETIST, CERTIFIED REGISTERED

## 2022-06-22 PROCEDURE — 82607 VITAMIN B-12: CPT | Performed by: PHYSICIAN ASSISTANT

## 2022-06-22 PROCEDURE — 25000003 PHARM REV CODE 250: Performed by: ANESTHESIOLOGY

## 2022-06-22 PROCEDURE — 83540 ASSAY OF IRON: CPT | Performed by: PHYSICIAN ASSISTANT

## 2022-06-22 PROCEDURE — 37000009 HC ANESTHESIA EA ADD 15 MINS: Performed by: INTERNAL MEDICINE

## 2022-06-22 PROCEDURE — 63600175 PHARM REV CODE 636 W HCPCS: Performed by: NURSE ANESTHETIST, CERTIFIED REGISTERED

## 2022-06-22 PROCEDURE — 27201423 OPTIME MED/SURG SUP & DEVICES STERILE SUPPLY: Performed by: INTERNAL MEDICINE

## 2022-06-22 RX ORDER — SODIUM CHLORIDE, SODIUM GLUCONATE, SODIUM ACETATE, POTASSIUM CHLORIDE AND MAGNESIUM CHLORIDE 30; 37; 368; 526; 502 MG/100ML; MG/100ML; MG/100ML; MG/100ML; MG/100ML
1000 INJECTION, SOLUTION INTRAVENOUS CONTINUOUS
Status: ACTIVE | OUTPATIENT
Start: 2022-06-22 | End: 2022-06-23

## 2022-06-22 RX ORDER — PROPOFOL 10 MG/ML
VIAL (ML) INTRAVENOUS
Status: COMPLETED
Start: 2022-06-22 | End: 2022-06-22

## 2022-06-22 RX ORDER — SODIUM CHLORIDE, SODIUM LACTATE, POTASSIUM CHLORIDE, CALCIUM CHLORIDE 600; 310; 30; 20 MG/100ML; MG/100ML; MG/100ML; MG/100ML
INJECTION, SOLUTION INTRAVENOUS CONTINUOUS PRN
Status: DISCONTINUED | OUTPATIENT
Start: 2022-06-22 | End: 2022-06-22

## 2022-06-22 RX ORDER — LIDOCAINE HCL/PF 100 MG/5ML
SYRINGE (ML) INTRAVENOUS
Status: DISCONTINUED | OUTPATIENT
Start: 2022-06-22 | End: 2022-06-22

## 2022-06-22 RX ORDER — PROPOFOL 10 MG/ML
INJECTION, EMULSION INTRAVENOUS
Status: DISCONTINUED | OUTPATIENT
Start: 2022-06-22 | End: 2022-06-22

## 2022-06-22 RX ADMIN — PROPOFOL 50 MG: 10 INJECTION, EMULSION INTRAVENOUS at 02:06

## 2022-06-22 RX ADMIN — PROPOFOL 100 MCG/KG/MIN: 10 INJECTION, EMULSION INTRAVENOUS at 02:06

## 2022-06-22 RX ADMIN — SODIUM SULFATE, POTASSIUM SULFATE, MAGNESIUM SULFATE 177 ML: 17.5; 3.13; 1.6 SOLUTION, CONCENTRATE ORAL at 03:06

## 2022-06-22 RX ADMIN — SODIUM CHLORIDE, SODIUM GLUCONATE, SODIUM ACETATE, POTASSIUM CHLORIDE AND MAGNESIUM CHLORIDE 1000 ML: 526; 502; 368; 37; 30 INJECTION, SOLUTION INTRAVENOUS at 11:06

## 2022-06-22 RX ADMIN — LIDOCAINE HYDROCHLORIDE 80 MG: 20 INJECTION, SOLUTION INTRAVENOUS at 02:06

## 2022-06-22 RX ADMIN — SODIUM CHLORIDE, POTASSIUM CHLORIDE, SODIUM LACTATE AND CALCIUM CHLORIDE: 600; 310; 30; 20 INJECTION, SOLUTION INTRAVENOUS at 01:06

## 2022-06-22 NOTE — TRANSFER OF CARE
Anesthesia Transfer of Care Note    Patient: Alexa Gonzales    Procedure(s) Performed: Procedure(s) (LRB):  COLON (N/A)    Patient location: GI    Anesthesia Type: MAC    Transport from OR: Transported from OR on room air with adequate spontaneous ventilation    Post pain: adequate analgesia    Post assessment: no apparent anesthetic complications    Post vital signs: stable    Level of consciousness: responds to stimulation    Nausea/Vomiting: no nausea/vomiting    Complications: none    Transfer of care protocol was followed      Last vitals:   Visit Vitals  /84 (BP Location: Right arm, Patient Position: Lying)   Pulse 61   Temp 36.9 °C (98.5 °F) (Oral)   Resp 17   Ht 5' (1.524 m)   Wt 77.1 kg (170 lb)   SpO2 100%   Breastfeeding No   BMI 33.20 kg/m²

## 2022-06-22 NOTE — OP NOTE
Colonoscopy Procedure Note    Surgeon: TRACEY Kuhn MD    Referring MD:  Iesha GARCIA    Procedure: Colonoscopy    Indications:  Recurrent iron deficiency anemia with positive stool       Post op Diagnosis:  Normal colon and terminal        Sedation: Per anesthesia       Procedure Details: The patient was brought to the endoscopy suite after the risks, benefits, and alternatives of the procedure were described and the patient was given the opportunity to ask questions and signed informed consent. Patient was positioned in the left lateral decubitus position, continuous monitoring was initiated, and supplemental oxygen was provided via nasal cannula. Adequate sedation was achieved with the medications documented in chart and titrated during the procedure. A digital rectal exam was performed. Under direct visualization the colonoscope was introduced into the rectum and advanced to the cecum. The ileocecal valve and appendiceal orifice were identified. The terminal ileum was ileum  intubated. The scope was withdrawn, and the mucosa was examined in a circular fashion. Retroflexion was done in the rectum. Air was evacuated from the colon and the procedure was completed. The patient tolerated the procedure well and was transferred to the recovery area in stable condition.     Findings:  1. Normal appearing distal 5 cm of the terminal ileum ileocecal valve appendiceal orifice cecum ascending colon antegrade and retrogradely hepatic flexure transverse colon splenic flexure descending colon sigmoid and rectum.  Retroflexed view of the rectum was unremarkable the patient understood quite well    Impression and Recommendations:   Normal colon and terminal ileum capsule endoscopy planned.       TRACEY Kuhn MD

## 2022-06-22 NOTE — PROGRESS NOTES
[facility] - 5th Floor [facility]  Progress Note    Patient Name: Alexa Gonzales  MRN: [id number]  Patient Class: IP- Inpatient   Admission Date: 6/20/2022  Length of Stay: 1 days  Attending Physician: Vladimir Bermudez MD  Primary Care Provider: JOSE Tolliver        Subjective:     Principal Problem:Acute blood loss anemia    Interval History: The EGD yesterday was normal.  Colonoscopy today    Review of Systems   Constitutional: Negative.    HENT: Negative.    Eyes: Negative.    Respiratory: Negative.    Cardiovascular: Negative.    Gastrointestinal: Negative.    Endocrine: Negative.    Genitourinary: Negative.    Musculoskeletal: Negative.    Skin: Negative.    Allergic/Immunologic: Negative.    Neurological: Negative.    Hematological: Negative.    Psychiatric/Behavioral: Negative.    All other systems reviewed and are negative.    Objective:     Vital Signs (Most Recent):  Temp: 98.5 °F (36.9 °C) (06/22/22 0709)  Pulse: 61 (06/22/22 1101)  Resp: 17 (06/22/22 1101)  BP: 118/84 (06/22/22 1101)  SpO2: 100 % (Room air) (06/22/22 1101) Vital Signs (24h Range):  Temp:  [98.1 °F (36.7 °C)-98.5 °F (36.9 °C)] 98.5 °F (36.9 °C)  Pulse:  [54-68] 61  Resp:  [12-24] 17  SpO2:  [99 %-100 %] 100 %  BP: (104-131)/(64-84) 118/84     Weight: 77.1 kg (170 lb)  Body mass index is 33.2 kg/m².    Intake/Output Summary (Last 24 hours) at 6/22/2022 1350  Last data filed at 6/21/2022 1351  Gross per 24 hour   Intake 330 ml   Output --   Net 330 ml      Physical Exam  Vitals reviewed.   Constitutional:       Appearance: Normal appearance.   HENT:      Head: Normocephalic and atraumatic.      Right Ear: External ear normal.      Left Ear: External ear normal.      Nose: Nose normal.      Mouth/Throat:      Mouth: Mucous membranes are moist.   Eyes:      Extraocular Movements: Extraocular movements intact.      Pupils: Pupils are equal, round, and reactive to light.   Cardiovascular:       Rate and Rhythm: Normal rate and regular rhythm.      Pulses: Normal pulses.      Heart sounds: Normal heart sounds.   Pulmonary:      Effort: Pulmonary effort is normal.      Breath sounds: Normal breath sounds.   Abdominal:      General: Abdomen is flat. Bowel sounds are normal.      Palpations: Abdomen is soft.   Musculoskeletal:         General: Normal range of motion.      Cervical back: Neck supple.   Skin:     General: Skin is warm and dry.   Neurological:      General: No focal deficit present.      Mental Status: She is alert. Mental status is at baseline.   Psychiatric:         Mood and Affect: Mood normal.           Overview/Hospital Course:     Significant Labs:   All pertinent labs within the past 24 hours have been reviewed.  CBC:   Recent Labs   Lab 06/21/22  1357 06/21/22 2008 06/22/22  0918   HGB 10.9* 9.7* 10.4*   HCT 36.6* 33.0* 36.3*       Significant Imaging: I have reviewed all pertinent imaging results/findings within the past 24 hours.  I have reviewed and interpreted all pertinent imaging results/findings within the past 24 hours.    Assessment/Plan:      Active Diagnoses:    Diagnosis Date Noted POA    PRINCIPAL PROBLEM:  Acute blood loss anemia [D62] 06/21/2022 Unknown      Problems Resolved During this Admission:     VTE Risk Mitigation (From admission, onward)         Ordered     IP VTE HIGH RISK PATIENT  Once         06/21/22 0411     Place sequential compression device  Until discontinued         06/21/22 0411               I would colonoscopy results to determine next step.  She may need a pill camera study or a bleeding scan down the jillian.    Vladimir Bermudez MD  Department of Hospital Medicine   [facility] - 5th Floor Med Surg

## 2022-06-22 NOTE — INTERVAL H&P NOTE
The patient has been examined and the H&P has been updated.    JPHMD          Active Hospital Problems    Diagnosis  POA    *Acute blood loss anemia [D62]  Unknown      Resolved Hospital Problems   No resolved problems to display.

## 2022-06-22 NOTE — ANESTHESIA PREPROCEDURE EVALUATION
06/22/2022  Alexa Gonzales is a 27 y.o., female with Past Medical History:  Anemia, unspecified  And No past surgical history on file.    Here for Colonoscopy to investigate anemia    Recent Labs   Lab 06/22/22  0918   HGB 10.4*   HCT 36.3*       No results for input(s): NA, K, CL, CO2, ANIONGAP, BUN, CREATININE, GLU, CALCIUM, PH, MG, ALBUMIN, PROT, ALKPHOS, ALT, AST, BILITOT in the last 168 hours.    No results for input(s): PT, PTT, INR in the last 168 hours.      Pt is s/p EGD from yesterday without anesthetic issues      Pre-op Assessment    I have reviewed the NPO Status.      Review of Systems      Physical Exam  General: Well nourished, Cooperative, Alert and Oriented    Airway:  Mallampati: III   Mouth Opening: Normal  TM Distance: Normal  Tongue: Normal  Neck ROM: Normal ROM    Dental:  Intact    Chest/Lungs:  Clear to auscultation, Normal Respiratory Rate    Heart:  Rate: Normal  Rhythm: Regular Rhythm        Anesthesia Plan  Type of Anesthesia, risks & benefits discussed:    Anesthesia Type: Gen Natural Airway  Intra-op Monitoring Plan: Standard ASA Monitors  Post Op Pain Control Plan: IV/PO Opioids PRN  Induction:  IV  Airway Plan: Direct  Informed Consent: Informed consent signed with the Patient and all parties understand the risks and agree with anesthesia plan.  All questions answered. Patient consented to blood products? No  ASA Score: 2  Day of Surgery Review of History & Physical: H&P Update referred to the surgeon/provider.  Anesthesia Plan Notes: Nasal cannula vs facemask supplemental oxygenation   For patients with NATALIE/obesity, may consider SuperNoval Nasal CPAP      Ready For Surgery From Anesthesia Perspective.     .

## 2022-06-23 VITALS
WEIGHT: 170 LBS | DIASTOLIC BLOOD PRESSURE: 69 MMHG | SYSTOLIC BLOOD PRESSURE: 105 MMHG | BODY MASS INDEX: 33.38 KG/M2 | RESPIRATION RATE: 18 BRPM | HEART RATE: 57 BPM | TEMPERATURE: 99 F | HEIGHT: 60 IN | OXYGEN SATURATION: 98 %

## 2022-06-23 LAB
DHEA SERPL-MCNC: NORMAL
ESTROGEN SERPL-MCNC: NORMAL PG/ML
INSULIN SERPL-ACNC: NORMAL U[IU]/ML
LAB AP CLINICAL INFORMATION: NORMAL
LAB AP GROSS DESCRIPTION: NORMAL
LAB AP REPORT FOOTNOTES: NORMAL
T3RU NFR SERPL: NORMAL %

## 2022-06-23 NOTE — PROGRESS NOTES
Gastroenterology Progress Note      HPI:    Colonoscopy and EGD unrevealing for active bleeding. M2A done last night, report pending. hgb stable. 1 BM yesterday was normal. VSS, no GI complaints.    ROS:    Review of Systems   Constitutional: Negative for fever, malaise/fatigue and weight loss.   Respiratory: Negative for cough and shortness of breath.    Cardiovascular: Negative for chest pain, palpitations and leg swelling.   Gastrointestinal: Negative for abdominal pain, blood in stool, constipation, diarrhea, heartburn, melena, nausea and vomiting.   Musculoskeletal: Negative for back pain and myalgias.   Skin: Negative for rash.   Neurological: Negative for speech change and focal weakness.   All other systems reviewed and are negative.        Vital Signs:  /69   Pulse (!) 57   Temp 98.5 °F (36.9 °C) (Oral)   Resp 18   Ht 5' (1.524 m)   Wt 77.1 kg (170 lb)   SpO2 98%   Breastfeeding No   BMI 33.20 kg/m²   Body mass index is 33.2 kg/m².    Physical Exam:    Physical Exam  Vitals and nursing note reviewed.   Constitutional:       Appearance: Normal appearance.   HENT:      Head: Normocephalic and atraumatic.   Eyes:      General: No scleral icterus.     Extraocular Movements: Extraocular movements intact.   Cardiovascular:      Rate and Rhythm: Normal rate and regular rhythm.      Heart sounds: Normal heart sounds.   Pulmonary:      Effort: Pulmonary effort is normal. No respiratory distress.      Breath sounds: Normal breath sounds.   Abdominal:      General: Abdomen is flat. Bowel sounds are normal. There is no distension.      Palpations: Abdomen is soft.      Tenderness: There is no abdominal tenderness.   Musculoskeletal:         General: No swelling or deformity. Normal range of motion.      Right lower leg: No edema.      Left lower leg: No edema.   Skin:     General: Skin is warm and dry.   Neurological:      General: No focal deficit present.      Mental Status: She is alert and oriented to  person, place, and time.   Psychiatric:         Mood and Affect: Mood normal.         Behavior: Behavior normal.         Labs:  Recent Results (from the past 48 hour(s))   COVID-19 Rapid Screening    Collection Time: 06/21/22  9:51 AM   Result Value Ref Range    SARS COV-2 MOLECULAR Negative Negative   POCT urine pregnancy    Collection Time: 06/21/22 12:11 PM   Result Value Ref Range    POC Preg Test, Ur Negative Negative     Acceptable Yes    Hemoglobin and Hematocrit    Collection Time: 06/21/22  1:57 PM   Result Value Ref Range    Hgb 10.9 (L) 12.0 - 16.0 gm/dL    Hct 36.6 (L) 37.0 - 47.0 %   Hemoglobin and Hematocrit    Collection Time: 06/21/22  8:08 PM   Result Value Ref Range    Hgb 9.7 (L) 12.0 - 16.0 gm/dL    Hct 33.0 (L) 37.0 - 47.0 %   Hemoglobin and Hematocrit    Collection Time: 06/22/22  9:18 AM   Result Value Ref Range    Hgb 10.4 (L) 12.0 - 16.0 gm/dL    Hct 36.3 (L) 37.0 - 47.0 %   Iron and TIBC    Collection Time: 06/22/22  5:30 PM   Result Value Ref Range    Iron Binding Capacity Unsaturated 308 70 - 310 ug/dL    Iron Level 63 50 - 170 ug/dL    Transferrin 350 180 - 382 mg/dL    Iron Binding Capacity Total 371 250 - 450 ug/dL    Iron Saturation 17 (L) 20 - 50 %   Ferritin    Collection Time: 06/22/22  5:30 PM   Result Value Ref Range    Ferritin Level 94.07 4.63 - 204.00 ng/mL   Vitamin B12    Collection Time: 06/22/22  5:30 PM   Result Value Ref Range    Vitamin B12 Level 257 213 - 816 pg/mL   Folate    Collection Time: 06/22/22  5:30 PM   Result Value Ref Range    Folate Level 13.0 7.0 - 31.4 ng/mL   Hemoglobin and Hematocrit    Collection Time: 06/22/22  5:31 PM   Result Value Ref Range    Hgb 10.3 (L) 12.0 - 16.0 gm/dL    Hct 35.9 (L) 37.0 - 47.0 %         Assessment/Plan:  26 y/o female unknown to our group with pmhx of chronic anemia admitted with low H&H and reports of dark stool.    Colonoscopy 6/22/22: Findings:  1. Normal appearing distal 5 cm of the terminal ileum  "ileocecal valve appendiceal orifice cecum ascending colon antegrade and retrogradely hepatic flexure transverse colon splenic flexure descending colon sigmoid and rectum.  Retroflexed view of the rectum was unremarkable the patient understood quite well     EGD 6/21/22 Normal appearing upper mid and lower esophagus.  2 cm hiatal hernia.  The vascular and mucosal pattern of the body cardia fundus antrum pylorus were visualized including a retroflexed view and were unremarkable.  There is no blood in the stomach.  The duodenal bulb was unremarkable the 1st portion of the duodenum had some Brunner gland hypertrophy with 1 area of erosion.  The 2nd and 3rd portions were visualized and were normal.  There was no blood in the duodenum noted.  The patient tolerated procedure quite well.       1. Acute on chronic anemia  - long hx of chronic anemia requiring transfusions. Questionably heavy menstrual cycles. She is on depo-provera but states that her cycles include large "clots."   - on iron supplementation at home  - hgb 6.5 + 2 u --- 10.3. stable  - M2A report pending  - likely no GI bleeding source.   - continue PPI qd  - regular diet      Agueda Staples PA-C  [facility]    "

## 2022-06-23 NOTE — DISCHARGE SUMMARY
[facility] - 5th Floor [facility] Medicine  Discharge Summary      Patient Name: Alexa Gonzales  MRN: [id number]  Admission Date: 6/20/2022  Hospital Length of Stay: 2 days  Discharge Date and Time:  06/23/2022 7:22 AM  Attending Physician: Vladimir Bermudez MD   Discharging Provider: Vladimir Bermudez MD  Primary Care Provider: JOSE Tolliver        HPI: This is a patient that is followed by nurse practitioner Iesha Thomas. Patient is now rates to me that this past Saturday, June 18 she was contacted via telephone by her nurse practitioner advising her that her hemoglobin was 6 that she should immediately go to the emergency room at [facility].  The patient did not follow her instructions until last night because she told me that she had things to do.  At [facility] the OR and GI sweets are closed due to equipment failure and replacement and the patient was transferred to [facility].  Patient tells me that she has been anemic for a very long time, years.  She has never had a GI workup.  And she states that she does not have heavy periods.  She states that she has had blood transfusions in the past ×2 events.  Patient does not feel weak or faint C.  No use of NSAIDs or any other type of blood thinners.  Patient states that she had a CAT scan of the abdomen and pelvis at the emergency room at [facility] and she was told that those results were normal.  Plan he was to transfuse her primary Consul GI and see if they want to work her up as an outpatient or proceed with scopes. Of importance to note is that patient is covered in tattoos    Procedure(s) (LRB):  COLON (N/A)  IMAGING PROCEDURE, GI TRACT, INTRALUMINAL, VIA CAPSULE (N/A)      Hospital Course:  While speaking with her she says she has had this problem very chronically for a long time.  She did undergo EGD which was unremarkable.  She then underwent colonoscopy which was unremarkable either.  She has  completed the pill study.  She is stable.  I will discharge her once GI those rounds and have her follow-up with primary care and with GI Services as an outpatient.  I suspect that she has an arterial venous malformation as causing her to have intermittent bleeding.  She may have to have a bleeding scan performed down the road.    Consults:   Consults (From admission, onward)        Status Ordering Provider     Inpatient consult to Gastroenterology  Once        Provider:  Torres Bedoya MD    Completed GUALBERTO ALEXANDRE          Final Active Diagnoses:    Diagnosis Date Noted POA    PRINCIPAL PROBLEM:  Acute blood loss anemia [D62] 06/21/2022 Yes    Occult blood positive stool [R19.5] 06/22/2022 Yes    Symptomatic anemia [D64.9] 06/22/2022 Yes      Problems Resolved During this Admission:      Discharged Condition: stable    Disposition: Home or Self Care    Follow Up:   Follow-up Information     JOSE Tolliver Follow up.    Specialty: Family Medicine  Why: Next week.  Contact information:  [address] [place] [address] [id number] [phone]             TRACEY Kuhn MD Follow up.    Specialty: Gastroenterology  Why: At at their convenience.  Contact information:  [address] [place] [id number] [phone]                       Patient Instructions:   No discharge procedures on file.  Medications:  Reconciled Home Medications:      Medication List      You have not been prescribed any medications.         Significant Diagnostic Studies: Labs:   CBC   Recent Labs   Lab 06/21/22 2008 06/22/22  0918 06/22/22  1731   HGB 9.7* 10.4* 10.3*   HCT 33.0* 36.3* 35.9*       Pending Diagnostic Studies:     Procedure Component Value Units Date/Time    Occult Blood, Stool 2nd Specimen [[id number]]     Order Status: Sent Lab Status: No result     Specimen: Stool     Occult Blood, Stool, Diagnostic (1-3) [[id number]]     Order Status: Sent Lab Status: No result     Specimen:  Stool     Narrative:      The following orders were created for panel order Occult Blood, Stool, Diagnostic (1-3).  Procedure                               Abnormality         Status                     ---------                               -----------         ------                     Occult blood x 3, stool[[id number]]                                                     Occult Blood, Stool 2nd ...[[id number]]                                                   Please view results for these tests on the individual orders.    Occult blood x 3, stool [[id number]]     Order Status: Sent Lab Status: No result     Specimen: Stool         Indwelling Lines/Drains at time of discharge:   Lines/Drains/Airways     None                 Time spent on the discharge of patient: 15 minutes         Vladimir Bermudez MD  Department of Hospital Medicine  [facility] - 5th Floor Med Surg

## 2022-06-23 NOTE — ANESTHESIA POSTPROCEDURE EVALUATION
Anesthesia Post Evaluation    Patient: Alexa Gonzales    Procedure(s) Performed: Procedure(s) (LRB):  COLON (N/A)  IMAGING PROCEDURE, GI TRACT, INTRALUMINAL, VIA CAPSULE (N/A)    Final Anesthesia Type: general      Patient location during evaluation: PACU  Patient participation: Yes- Able to Participate  Level of consciousness: awake and alert  Post-procedure vital signs: reviewed and stable  Pain management: adequate    PONV status at discharge: No PONV  Anesthetic complications: no      Cardiovascular status: hemodynamically stable  Respiratory status: unassisted and room air  Hydration status: euvolemic  Follow-up not needed.          Vitals Value Taken Time   /69 06/23/22 0724   Temp 36.9 °C (98.5 °F) 06/23/22 0724   Pulse 57 06/23/22 0724   Resp 18 06/23/22 0724   SpO2 98 % 06/23/22 0724         No case tracking events are documented in the log.      Pain/Daniel Score: Daniel Score: 10 (6/22/2022  2:48 PM)

## 2022-06-23 NOTE — HOSPITAL COURSE
Patient had positive stool and blood.  She did have an EGD which was unremarkable.  This was followed up by a colonoscopy which unremarkable.  She has completed her pill study and has been stable.  I suspect that patient has an arterial venous malformation that intermittently bleeds disease seems to be a very chronic condition for her.  I think in the future she will need a bleeding study to see if we can find the AVM.  Feels study has ended and if is okay with the GI Services will discharge her home today.

## 2022-06-27 ENCOUNTER — PATIENT OUTREACH (OUTPATIENT)
Dept: ADMINISTRATIVE | Facility: CLINIC | Age: 27
End: 2022-06-27
Payer: MEDICAID

## 2022-06-27 NOTE — PROGRESS NOTES
"C3 nurse spoke with Alexa Gonzales for a TCC post hospital discharge follow up call, call completed. The patient does not have a scheduled HOSFU yet, pt will call for appt.  States she called GI doctor for follow-up appt but they are waiting for results of "pill scope" and will call her.  Pt states she takes iron daily.    "

## 2022-09-19 ENCOUNTER — OFFICE VISIT (OUTPATIENT)
Dept: GYNECOLOGY | Facility: CLINIC | Age: 27
End: 2022-09-19
Payer: MEDICAID

## 2022-09-19 ENCOUNTER — LAB VISIT (OUTPATIENT)
Dept: LAB | Facility: HOSPITAL | Age: 27
End: 2022-09-19
Attending: STUDENT IN AN ORGANIZED HEALTH CARE EDUCATION/TRAINING PROGRAM
Payer: MEDICAID

## 2022-09-19 VITALS
WEIGHT: 173.06 LBS | RESPIRATION RATE: 20 BRPM | HEART RATE: 68 BPM | OXYGEN SATURATION: 99 % | TEMPERATURE: 98 F | HEIGHT: 60 IN | DIASTOLIC BLOOD PRESSURE: 78 MMHG | BODY MASS INDEX: 33.98 KG/M2 | SYSTOLIC BLOOD PRESSURE: 114 MMHG

## 2022-09-19 DIAGNOSIS — Z01.419 WOMEN'S ANNUAL ROUTINE GYNECOLOGICAL EXAMINATION: ICD-10-CM

## 2022-09-19 DIAGNOSIS — Z01.419 WOMEN'S ANNUAL ROUTINE GYNECOLOGICAL EXAMINATION: Primary | ICD-10-CM

## 2022-09-19 PROBLEM — E66.9 OBESITY: Status: ACTIVE | Noted: 2022-09-19

## 2022-09-19 LAB
C TRACH DNA SPEC QL NAA+PROBE: NOT DETECTED
HAV IGM SERPL QL IA: NONREACTIVE
HBV CORE IGM SERPL QL IA: NONREACTIVE
HBV SURFACE AG SERPL QL IA: NONREACTIVE
HCV AB SERPL QL IA: NONREACTIVE
HIV 1+2 AB+HIV1 P24 AG SERPL QL IA: NONREACTIVE
N GONORRHOEA DNA SPEC QL NAA+PROBE: NOT DETECTED
T PALLIDUM AB SER QL: NONREACTIVE

## 2022-09-19 PROCEDURE — 99214 OFFICE O/P EST MOD 30 MIN: CPT | Mod: PBBFAC

## 2022-09-19 PROCEDURE — 86780 TREPONEMA PALLIDUM: CPT

## 2022-09-19 PROCEDURE — 80074 ACUTE HEPATITIS PANEL: CPT

## 2022-09-19 PROCEDURE — 87491 CHLMYD TRACH DNA AMP PROBE: CPT

## 2022-09-19 PROCEDURE — 87591 N.GONORRHOEAE DNA AMP PROB: CPT

## 2022-09-19 PROCEDURE — 87389 HIV-1 AG W/HIV-1&-2 AB AG IA: CPT

## 2022-09-19 PROCEDURE — 36415 COLL VENOUS BLD VENIPUNCTURE: CPT

## 2022-09-19 RX ORDER — FERROUS GLUCONATE 324(38)MG
1 TABLET ORAL DAILY
COMMUNITY
Start: 2022-04-26

## 2022-09-19 NOTE — PROGRESS NOTES
[facility] GYNECOLOGY CLINIC NOTE     Alexa Gonzales is a 27 y.o.  presenting to GYN clinic for annual exam. She has a history of AUB and cervical dysplasia (2016).  Pt reports no current complaints. Menses are now regular lasting 3-5 days, using 3-4 pads daily with improvement in bleeding from previous.  Denies discharge, pain, new partners. Agrees with annual STI screening.       Abnormal GYN cytology Hx:   ASCUS/HPV +   LSIL  2016 LSIL  2016 CINI/ ECC BENIGN   LSIL/HPV NEG  2018 ASCUS  2019: Colpo: 12:00 benign; ECC scant, insufficient  : NIL HPV negative    Gynecology  OB History          3    Para   3    Term                AB        Living             SAB        IAB        Ectopic        Multiple        Live Births                    Past Medical History:   Diagnosis Date    Anemia, unspecified       Past Surgical History:   Procedure Laterality Date    COLONOSCOPY N/A 2022    Procedure: COLON;  Surgeon: TRACEY Kuhn MD;  Location: [facility] ENDOSCOPY;  Service: Gastroenterology;  Laterality: N/A;    ESOPHAGOGASTRODUODENOSCOPY N/A 2022    Procedure: EGD (ESOPHAGOGASTRODUODENOSCOPY);  Surgeon: TRACEY Kuhn MD;  Location: [facility] ENDOSCOPY;  Service: Gastroenterology;  Laterality: N/A;    INTRALUMINAL GASTROINTESTINAL TRACT IMAGING VIA CAPSULE N/A 2022    Procedure: IMAGING PROCEDURE, GI TRACT, INTRALUMINAL, VIA CAPSULE;  Surgeon: TRACEY Kuhn MD;  Location: [facility] ENDOSCOPY;  Service: Gastroenterology;  Laterality: N/A;      Current Outpatient Medications   Medication Instructions    ferrous gluconate (FERGON) 324 MG tablet 1 tablet, Oral, Daily     Social History     Tobacco Use    Smoking status: Never    Smokeless tobacco: Never   Substance Use Topics    Alcohol use: Yes     Comment: occasionally    Drug use: Never       Review of Systems  A comprehensive review of systems was negative.     Objective:     /78 (BP  Location: Right arm, Patient Position: Sitting, BP Method: Large (Automatic))   Pulse 68   Temp 98.1 °F (36.7 °C) (Oral)   Resp 20   Ht 5' (1.524 m)   Wt 78.5 kg (173 lb 1 oz)   SpO2 99%   BMI 33.80 kg/m²   Physical Exam:  Gen: Well-nourished, well-developed female appearing stated age. Alert, cooperative, in no acute distress.  HEENT: No thyromegaly, goiter, or masses  Breasts: General/bilateral: ° Appearance of the breast was without rashes or lesions. Palpation of the right breast revealed no lumps, tenderness, or nipple discharge  Palpation of the left breast revealed no lumps, tenderness, or nipple discharge   CV: rrr, no murmurs/rubs/gallops  Chest: ctabl, no wheezes/rales/rhonchi  Abdomen: Soft, non-tender, no masses.  Extrem: Extremities normal, atraumatic, non-tender calves.  External genitalia: Normal female genetalia without lesion, discharge or tenderness.  Speculum Exam: Vaginal vault without discharge, nonodorous, no lesions/masses seen.  Cervical os visualized as closed, no lesions/masses. Scant blood.  Bimanual Exam: No cervical motion tenderness.  Uterus freely mobile.  Normal size uterus. No adnexal masses.  Nontender exam.   Note: RN chaperone present for entirety of exam.      Assessment:       27 y.o.  here for annual exam.       Problem List Items Addressed This Visit    None  Visit Diagnoses       Women's annual routine gynecological examination    -  Primary            Plan:       Plan for pap 3 yrs from last negative in   STI screening ordered  GC swab today in office      Return to clinic 1 year annual exam    Carla Olvera M.D.  [facility] Family Medicine -2

## 2022-09-20 LAB — PATH REV: NORMAL

## 2023-09-25 ENCOUNTER — TELEPHONE (OUTPATIENT)
Dept: GYNECOLOGY | Facility: CLINIC | Age: 28
End: 2023-09-25

## 2023-09-25 ENCOUNTER — OFFICE VISIT (OUTPATIENT)
Dept: GYNECOLOGY | Facility: CLINIC | Age: 28
End: 2023-09-25
Payer: MEDICAID

## 2023-09-25 VITALS
HEIGHT: 60 IN | BODY MASS INDEX: 34.04 KG/M2 | TEMPERATURE: 98 F | HEART RATE: 71 BPM | OXYGEN SATURATION: 100 % | DIASTOLIC BLOOD PRESSURE: 81 MMHG | WEIGHT: 173.38 LBS | SYSTOLIC BLOOD PRESSURE: 122 MMHG | RESPIRATION RATE: 20 BRPM

## 2023-09-25 DIAGNOSIS — B96.89 BV (BACTERIAL VAGINOSIS): Primary | ICD-10-CM

## 2023-09-25 DIAGNOSIS — Z11.3 ROUTINE SCREENING FOR STI (SEXUALLY TRANSMITTED INFECTION): ICD-10-CM

## 2023-09-25 DIAGNOSIS — Z01.419 ENCOUNTER FOR ANNUAL ROUTINE GYNECOLOGICAL EXAMINATION: Primary | ICD-10-CM

## 2023-09-25 DIAGNOSIS — N76.0 BV (BACTERIAL VAGINOSIS): Primary | ICD-10-CM

## 2023-09-25 LAB
C TRACH DNA SPEC QL NAA+PROBE: NOT DETECTED
CLUE CELLS VAG QL WET PREP: ABNORMAL
N GONORRHOEA DNA SPEC QL NAA+PROBE: NOT DETECTED
SOURCE (OHS): NORMAL
T VAGINALIS VAG QL WET PREP: ABNORMAL
WBC #/AREA VAG WET PREP: ABNORMAL
YEAST SPEC QL WET PREP: ABNORMAL

## 2023-09-25 PROCEDURE — 1159F MED LIST DOCD IN RCRD: CPT | Mod: CPTII,,, | Performed by: NURSE PRACTITIONER

## 2023-09-25 PROCEDURE — 87591 N.GONORRHOEAE DNA AMP PROB: CPT | Performed by: NURSE PRACTITIONER

## 2023-09-25 PROCEDURE — 3008F BODY MASS INDEX DOCD: CPT | Mod: CPTII,,, | Performed by: NURSE PRACTITIONER

## 2023-09-25 PROCEDURE — 3079F DIAST BP 80-89 MM HG: CPT | Mod: CPTII,,, | Performed by: NURSE PRACTITIONER

## 2023-09-25 PROCEDURE — 3074F PR MOST RECENT SYSTOLIC BLOOD PRESSURE < 130 MM HG: ICD-10-PCS | Mod: CPTII,,, | Performed by: NURSE PRACTITIONER

## 2023-09-25 PROCEDURE — 3008F PR BODY MASS INDEX (BMI) DOCUMENTED: ICD-10-PCS | Mod: CPTII,,, | Performed by: NURSE PRACTITIONER

## 2023-09-25 PROCEDURE — 99395 PREV VISIT EST AGE 18-39: CPT | Mod: S$PBB,,, | Performed by: NURSE PRACTITIONER

## 2023-09-25 PROCEDURE — 3079F PR MOST RECENT DIASTOLIC BLOOD PRESSURE 80-89 MM HG: ICD-10-PCS | Mod: CPTII,,, | Performed by: NURSE PRACTITIONER

## 2023-09-25 PROCEDURE — 99395 PR PREVENTIVE VISIT,EST,18-39: ICD-10-PCS | Mod: S$PBB,,, | Performed by: NURSE PRACTITIONER

## 2023-09-25 PROCEDURE — 99213 OFFICE O/P EST LOW 20 MIN: CPT | Mod: PBBFAC | Performed by: NURSE PRACTITIONER

## 2023-09-25 PROCEDURE — 1159F PR MEDICATION LIST DOCUMENTED IN MEDICAL RECORD: ICD-10-PCS | Mod: CPTII,,, | Performed by: NURSE PRACTITIONER

## 2023-09-25 PROCEDURE — 87210 SMEAR WET MOUNT SALINE/INK: CPT | Performed by: NURSE PRACTITIONER

## 2023-09-25 PROCEDURE — 3074F SYST BP LT 130 MM HG: CPT | Mod: CPTII,,, | Performed by: NURSE PRACTITIONER

## 2023-09-25 PROCEDURE — 87491 CHLMYD TRACH DNA AMP PROBE: CPT | Performed by: NURSE PRACTITIONER

## 2023-09-25 RX ORDER — METRONIDAZOLE 500 MG/1
500 TABLET ORAL 2 TIMES DAILY
Qty: 14 TABLET | Refills: 0 | Status: SHIPPED | OUTPATIENT
Start: 2023-09-25 | End: 2023-10-02

## 2023-09-25 NOTE — PROGRESS NOTES
Subjective:       Patient ID: Alexa Gonzales is a 28 y.o. female.    Chief Complaint:  Gynecologic Exam      History of Present Illness  The patient  here for annual exam. Her LMP was 23. Period last 3-5 days and changes pads 3x/day. Hx of AUB and ANNE documented in chart. Pt denies AUB. Hx of blood transfusions, last iron infusions 6 month ago, not consistent with iron tablets, pt is followed by hematology in [place]. History of abnormal dysplasia/paps (see below). Denies breast or urinary complaints. Denies pelvic pain, abnormal bleeding or discharge. Pt reports no STIs in the past and desires testing. HPV vaccinated. Currently not on contraception and declines, hx of Depo use. Denies tobacco use. Dep. screening 0. Denies fly hx of ovarian, uterine cancer. Breast cancer in paternal great aunt and colon cancer in paternal great uncle. Colonoscopy in .    Abnormal GYN cytology Hx:   ASCUS/HPV +   LSIL  2016 LSIL  2016 CINI/ ECC BENIGN   LSIL/HPV NEG   ASCUS  2019: Colpo: 12:00 benign; ECC scant, insufficient  : NIL HPV negative     GYN & OB History  Patient's last menstrual period was 2023.   Date of Last Pap: 2021    Review of patient's allergies indicates:  No Known Allergies  Past Medical History:   Diagnosis Date    Abnormal Pap smear of cervix     Anemia, unspecified      OB History    Para Term  AB Living   3 3           SAB IAB Ectopic Multiple Live Births                  # Outcome Date GA Lbr Rico/2nd Weight Sex Delivery Anes PTL Lv   3 Para            2 Para            1 Para                 Review of Systems  Review of Systems    Negative except for pertinent findings for positives per HPI     Objective:    Physical Exam    /81 (BP Location: Left arm, Patient Position: Sitting, BP Method: Medium (Automatic))   Pulse 71   Temp 98.3 °F (36.8 °C) (Oral)   Resp 20   Ht 5' (1.524 m)   Wt 78.7 kg (173 lb 6.4 oz)   LMP 2023    SpO2 100%   BMI 33.86 kg/m²   GENERAL: Well-developed female in no acute distress.  SKIN: Normal to inspection, warm and intact.  BREASTS: No masses, lumps, discharge, tenderness.  VULVA: General appearance normal; external genitalia with no lesions or erythema.  VAGINA: Mucosa normal, pink, scant blood, no abnormal discharge or lesions.  CERVIX: Pink, no erythema or abnormal discharge.  BIMANUAL EXAM: reveals a 12 week-sized uterus. The uterus is regular, tender. Fran adnexa reveal no evidence of masses.  PSYCHIATRIC: Patient is oriented to person, place, and time. Mood and affect are normal.    Assessment:       1. Encounter for annual routine gynecological examination    2. Routine screening for STI (sexually transmitted infection)       Plan:   Alexa was seen today for gynecologic exam.    Diagnoses and all orders for this visit:    Encounter for annual routine gynecological examination    Routine screening for STI (sexually transmitted infection)  -     Chlamydia/GC, PCR  -     Wet Prep, Genital  -     HIV 1/2 Ag/Ab (4th Gen); Future  -     Hepatitis C Antibody; Future  -     Hepatitis B Surface Antigen; Future  -     SYPHILIS ANTIBODY (WITH REFLEX RPR); Future    Pelvic today, pap utd per ACOG  STI screen  Safe sex practices, OTC prenatal vitamins to prepare for pregnancy  Follow up in about 1 year (around 9/25/2024) for annual exam.

## 2023-09-26 NOTE — TELEPHONE ENCOUNTER
Spoke to patient to inform of positive BV results. Informed patient of medication sent to the pharmacy on file. Educated patient on possible hygiene changes. Patient verbalized a clear understanding and had no questions or concerns at this time.

## 2024-03-21 ENCOUNTER — OFFICE VISIT (OUTPATIENT)
Dept: GYNECOLOGY | Facility: CLINIC | Age: 29
End: 2024-03-21
Payer: MEDICAID

## 2024-03-21 ENCOUNTER — LAB VISIT (OUTPATIENT)
Dept: LAB | Facility: HOSPITAL | Age: 29
End: 2024-03-21
Attending: NURSE PRACTITIONER
Payer: MEDICAID

## 2024-03-21 VITALS
HEIGHT: 60 IN | OXYGEN SATURATION: 100 % | DIASTOLIC BLOOD PRESSURE: 88 MMHG | BODY MASS INDEX: 31.57 KG/M2 | SYSTOLIC BLOOD PRESSURE: 123 MMHG | WEIGHT: 160.81 LBS | TEMPERATURE: 98 F | HEART RATE: 80 BPM

## 2024-03-21 DIAGNOSIS — B00.9 HSV INFECTION: Primary | ICD-10-CM

## 2024-03-21 DIAGNOSIS — R45.89 EMOTIONAL UPSET: ICD-10-CM

## 2024-03-21 DIAGNOSIS — B00.9 HSV INFECTION: ICD-10-CM

## 2024-03-21 PROCEDURE — 1159F MED LIST DOCD IN RCRD: CPT | Mod: CPTII,,, | Performed by: NURSE PRACTITIONER

## 2024-03-21 PROCEDURE — 3079F DIAST BP 80-89 MM HG: CPT | Mod: CPTII,,, | Performed by: NURSE PRACTITIONER

## 2024-03-21 PROCEDURE — 87538 HIV-2 PROBE&REVRSE TRNSCRIPJ: CPT

## 2024-03-21 PROCEDURE — 3074F SYST BP LT 130 MM HG: CPT | Mod: CPTII,,, | Performed by: NURSE PRACTITIONER

## 2024-03-21 PROCEDURE — 99213 OFFICE O/P EST LOW 20 MIN: CPT | Mod: S$PBB,,, | Performed by: NURSE PRACTITIONER

## 2024-03-21 PROCEDURE — 99213 OFFICE O/P EST LOW 20 MIN: CPT | Mod: PBBFAC | Performed by: NURSE PRACTITIONER

## 2024-03-21 PROCEDURE — 3008F BODY MASS INDEX DOCD: CPT | Mod: CPTII,,, | Performed by: NURSE PRACTITIONER

## 2024-03-21 PROCEDURE — 87535 HIV-1 PROBE&REVERSE TRNSCRPJ: CPT

## 2024-03-21 PROCEDURE — 86696 HERPES SIMPLEX TYPE 2 TEST: CPT

## 2024-03-21 PROCEDURE — 36415 COLL VENOUS BLD VENIPUNCTURE: CPT

## 2024-03-21 RX ORDER — METRONIDAZOLE 500 MG/1
500 TABLET ORAL EVERY 12 HOURS
COMMUNITY
Start: 2024-03-12

## 2024-03-21 NOTE — PROGRESS NOTES
[facility] -  Gynecology / Women's Health Clinic      Subjective:       Patient ID: Alexa Gonzales is a 29 y.o. female.    Chief Complaint:  STD    History of Present Illness  Pt presents today for repeat HSV testing. Initially pt states she has a lesion of concern that she thought was hair bump, but since diagnosis she wanted evaluation of lesion. Today, lesion no longer present. 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 She is concerned about accuracy since she is asymptomatic. Does admit recent unprotected intercourse with a new partner that has since also tested positive with no outbreaks. Depression screen positive (20) today, pt denies SI or HI. She admits she is upset d/t diagnosis not depressed.     GYN & OB History  Patient's last menstrual period was 2024.   Date of Last Pap: 2021    Review of patient's allergies indicates:  No Known Allergies  Past Medical History:   Diagnosis Date    Abnormal Pap smear of cervix     Anemia, unspecified      OB History    Para Term  AB Living   3 3           SAB IAB Ectopic Multiple Live Births                  # Outcome Date GA Lbr Rico/2nd Weight Sex Delivery Anes PTL Lv   3 Para            2 Para            1 Para                 Review of Systems  Review of Systems    Negative except for pertinent findings for positives per HPI     Objective:    Physical Exam    /88 (BP Location: Left arm)   Pulse 80   Temp 98.1 °F (36.7 °C)   Ht 5' (1.524 m)   Wt 72.9 kg (160 lb 12.8 oz)   LMP 2024   SpO2 100%   BMI 31.40 kg/m²   GENERAL: Well-developed female.   SKIN: Normal to inspection, warm and intact.  PSYCHIATRIC: Patient is oriented to person, place, and time. Emotional upset.    Assessment:         ICD-10-CM ICD-9-CM   1. HSV infection  B00.9 054.9   2. Emotional upset  R45.89 799.29     Plan:   Alexa was seen today for std.    Diagnoses and all orders for this visit:    HSV  infection  -     HSV 1 & 2, IgG; Future  -     HSV 1 & 2, IgM; Future    Emotional upset    Testing positive for HSV 1 & 2 with PCP last week, request retest for confirmation.  Although this test can distinguish between type 1 and type 2 HSV, it cannot distinguish between oral vs genital infection. Denies any outbreaks. Informed pt that HSV is a virus with high probability of recurrence/outbreaks. Medications available to decrease symptoms and outbreaks. Virus can shed to sexual partners even during asymptomatic periods and can lead to transmission. Encouraged safe sex practices with consistent condom use. Discussed usual prodromal symptoms and need to start episodic therapy at first sign of outbreak.       Keep the affected areas dry and clean.  Do not have sexual contact during active infections as it is contagious.  Safe sex practices. Latex condoms and female condoms may help to prevent the spread of the herpes virus.  Avoid rubbing or touching the blisters and sores. If you do touch the areas, wash your hands thoroughly.  Take medicines only as directed by your health care provider.  If you become pregnant, tell your health care provider if you have had genital herpes.     Depression screen 20, denies SI or HI. Upon discussion with pt, she states she is just upset d/t her recent diagnosis of HSV per PCP, denies depression. Declines any counseling/medication management at this time. Aware that she has to adjust to this new diagnosis.  Follow up for annual exam.

## 2024-03-22 LAB
HSV1 IGG SERPL QL IA: POSITIVE
HSV2 IGG SERPL QL IA: POSITIVE

## 2024-03-25 LAB — BEAKER SEE SCANNED REPORT: NORMAL

## 2024-05-10 LAB
C TRACH RRNA SPEC QL PROBE: NEGATIVE
GONORRHEA: NEGATIVE
HBV SURFACE AG SERPL QL IA: NEGATIVE
HCV AB SERPL QL IA: NEGATIVE
HIV 1+2 AB+HIV1 P24 AG SERPL QL IA: NEGATIVE
RPR: NONREACTIVE
RUBELLA IMMUNE STATUS: NORMAL

## 2024-09-29 ENCOUNTER — HOSPITAL ENCOUNTER (EMERGENCY)
Facility: HOSPITAL | Age: 29
Discharge: HOME | End: 2024-09-29
Payer: MEDICAID

## 2024-09-29 VITALS
WEIGHT: 293 LBS | TEMPERATURE: 98 F | BODY MASS INDEX: 57.52 KG/M2 | DIASTOLIC BLOOD PRESSURE: 76 MMHG | RESPIRATION RATE: 17 BRPM | OXYGEN SATURATION: 97 % | HEIGHT: 60 IN | SYSTOLIC BLOOD PRESSURE: 127 MMHG | HEART RATE: 85 BPM

## 2024-09-29 PROBLEM — E66.01 SEVERE OBESITY DUE TO EXCESS CALORIES AFFECTING PREGNANCY IN THIRD TRIMESTER: Status: ACTIVE | Noted: 2022-09-19

## 2024-09-29 PROBLEM — O99.213 SEVERE OBESITY DUE TO EXCESS CALORIES AFFECTING PREGNANCY IN THIRD TRIMESTER: Status: ACTIVE | Noted: 2022-09-19

## 2024-09-29 PROBLEM — Z3A.31 31 WEEKS GESTATION OF PREGNANCY: Status: ACTIVE | Noted: 2024-09-29

## 2024-09-29 PROBLEM — O34.219 HISTORY OF CESAREAN SECTION COMPLICATING PREGNANCY: Status: ACTIVE | Noted: 2024-09-29

## 2024-09-29 PROBLEM — O47.03 THREATENED PRETERM LABOR, THIRD TRIMESTER: Status: ACTIVE | Noted: 2024-09-29

## 2024-09-29 LAB
BACTERIA #/AREA URNS AUTO: ABNORMAL /HPF
BILIRUB UR QL STRIP.AUTO: NEGATIVE
CLARITY UR: CLEAR
COLOR UR AUTO: YELLOW
GLUCOSE UR QL STRIP: NORMAL
HGB UR QL STRIP: ABNORMAL
KETONES UR QL STRIP: NEGATIVE
LEUKOCYTE ESTERASE UR QL STRIP: 500
MUCOUS THREADS URNS QL MICRO: ABNORMAL /LPF
NITRITE UR QL STRIP: NEGATIVE
PH UR STRIP: 6.5 [PH]
PROT UR QL STRIP: ABNORMAL
RBC #/AREA URNS AUTO: ABNORMAL /HPF
SP GR UR STRIP.AUTO: 1.02 (ref 1–1.03)
SQUAMOUS #/AREA URNS LPF: ABNORMAL /HPF
UROBILINOGEN UR STRIP-ACNC: 2
WBC #/AREA URNS AUTO: ABNORMAL /HPF

## 2024-09-29 PROCEDURE — 99284 EMERGENCY DEPT VISIT MOD MDM: CPT | Mod: 25

## 2024-09-29 PROCEDURE — 96372 THER/PROPH/DIAG INJ SC/IM: CPT | Performed by: OBSTETRICS & GYNECOLOGY

## 2024-09-29 PROCEDURE — 25000003 PHARM REV CODE 250

## 2024-09-29 PROCEDURE — 63600175 PHARM REV CODE 636 W HCPCS: Performed by: OBSTETRICS & GYNECOLOGY

## 2024-09-29 PROCEDURE — 81001 URINALYSIS AUTO W/SCOPE: CPT | Performed by: OBSTETRICS & GYNECOLOGY

## 2024-09-29 PROCEDURE — 87086 URINE CULTURE/COLONY COUNT: CPT | Performed by: OBSTETRICS & GYNECOLOGY

## 2024-09-29 PROCEDURE — 87077 CULTURE AEROBIC IDENTIFY: CPT | Performed by: OBSTETRICS & GYNECOLOGY

## 2024-09-29 RX ORDER — CEFTRIAXONE 1 G/1
0.5 INJECTION, POWDER, FOR SOLUTION INTRAMUSCULAR; INTRAVENOUS
Status: COMPLETED | OUTPATIENT
Start: 2024-09-29 | End: 2024-09-29

## 2024-09-29 RX ORDER — LIDOCAINE HYDROCHLORIDE 10 MG/ML
2.1 INJECTION, SOLUTION EPIDURAL; INFILTRATION; INTRACAUDAL; PERINEURAL
Status: DISCONTINUED | OUTPATIENT
Start: 2024-09-29 | End: 2024-09-29 | Stop reason: HOSPADM

## 2024-09-29 RX ORDER — LIDOCAINE HYDROCHLORIDE 10 MG/ML
1 INJECTION, SOLUTION EPIDURAL; INFILTRATION; INTRACAUDAL; PERINEURAL
Status: DISCONTINUED | OUTPATIENT
Start: 2024-09-29 | End: 2024-09-29

## 2024-09-29 RX ORDER — LIDOCAINE HYDROCHLORIDE 10 MG/ML
INJECTION, SOLUTION INFILTRATION; PERINEURAL
Status: COMPLETED
Start: 2024-09-29 | End: 2024-09-29

## 2024-09-29 RX ADMIN — CEFTRIAXONE SODIUM 0.5 G: 1 INJECTION, POWDER, FOR SOLUTION INTRAMUSCULAR; INTRAVENOUS at 03:09

## 2024-09-29 RX ADMIN — LIDOCAINE HYDROCHLORIDE 1.05 ML: 10 INJECTION, SOLUTION INFILTRATION; PERINEURAL at 03:09

## 2024-09-29 NOTE — SUBJECTIVE & OBJECTIVE
Obstetric HPI:  Patient reports pelvic pressure, active fetal movement, Yes vaginal bleeding , No loss of fluid     This pregnancy has been complicated by 3 prior Csections.    OB History    Para Term  AB Living   4 3 3 0 0 0   SAB IAB Ectopic Multiple Live Births   0 0 0 0 0      # Outcome Date GA Lbr Rico/2nd Weight Sex Type Anes PTL Lv   4 Current            3 Term      CS-LTranv      2 Term      CS-LTranv      1 Term      CS-LTranv        Past Medical History:   Diagnosis Date    Abnormal Pap smear of cervix     Anemia, unspecified      Past Surgical History:   Procedure Laterality Date     SECTION      COLONOSCOPY N/A 2022    Procedure: COLON;  Surgeon: TRACEY Kuhn MD;  Location: [facility] ENDOSCOPY;  Service: Gastroenterology;  Laterality: N/A;    ESOPHAGOGASTRODUODENOSCOPY N/A 2022    Procedure: EGD (ESOPHAGOGASTRODUODENOSCOPY);  Surgeon: TRACEY Kuhn MD;  Location: [facility] ENDOSCOPY;  Service: Gastroenterology;  Laterality: N/A;    INTRALUMINAL GASTROINTESTINAL TRACT IMAGING VIA CAPSULE N/A 2022    Procedure: IMAGING PROCEDURE, GI TRACT, INTRALUMINAL, VIA CAPSULE;  Surgeon: TRACEY Kuhn MD;  Location: [facility] ENDOSCOPY;  Service: Gastroenterology;  Laterality: N/A;       (Not in a hospital admission)      Review of patient's allergies indicates:  No Known Allergies     Family History    None       Tobacco Use    Smoking status: Never    Smokeless tobacco: Never   Substance and Sexual Activity    Alcohol use: Not Currently     Comment: occasionally    Drug use: Never    Sexual activity: Yes     Review of Systems   Constitutional: Negative.    Respiratory: Negative.     Cardiovascular: Negative.    Gastrointestinal: Negative.    Endocrine: Negative.    Genitourinary: Negative.    Musculoskeletal: Negative.    Integumentary:  Negative.   Neurological: Negative.    Hematological: Negative.    Psychiatric/Behavioral: Negative.      Breast: negative.       Objective:     Vital Signs (Most Recent):  Temp: 97.8 °F (36.6 °C) (09/29/24 0105)  Pulse: 85 (09/29/24 0105)  Resp: 17 (09/29/24 0105)  BP: 127/76 (09/29/24 0105)  SpO2: 97 % (09/29/24 0105) Vital Signs (24h Range):  Temp:  [97.8 °F (36.6 °C)] 97.8 °F (36.6 °C)  Pulse:  [85] 85  Resp:  [17] 17  SpO2:  [97 %] 97 %  BP: (127)/(76) 127/76     Weight: 132.9 kg (293 lb)  Body mass index is 57.22 kg/m².    FHT: 135, reactive Cat 1 (reassuring)  TOCO:  No ctx's noted     Physical Exam:   Constitutional: She is oriented to person, place, and time. She appears well-developed and well-nourished.      Neck: No thyromegaly present.     Pulmonary/Chest: Effort normal. No respiratory distress.        Abdominal: Soft. She exhibits no distension and no mass. There is no abdominal tenderness. There is no rebound and no guarding.                 Neurological: She is alert and oriented to person, place, and time. No cranial nerve deficit. Coordination normal.     Psychiatric: She has a normal mood and affect. Her behavior is normal. Judgment and thought content normal.        Cervix:  Dilation:  0  Effacement:  50%  Station: -3  Presentation: Vertex     Significant Labs:  Lab Results   Component Value Date    GROUPTRH B POS 06/21/2022    HEPBSAG Nonreactive 09/25/2023       I have personallly reviewed all pertinent lab results from the last 24 hours.

## 2024-09-29 NOTE — HPI
29 yr  @ 31 wk 2 d presenting with spotting and passage of a small clot.  Preg complicated by 3 prior Csections.      Past Medical History:   Diagnosis Date    Abnormal Pap smear of cervix     Anemia, unspecified      Past Surgical History:   Procedure Laterality Date     SECTION      COLONOSCOPY N/A 2022    Procedure: COLON;  Surgeon: TRACEY Kuhn MD;  Location: [facility] ENDOSCOPY;  Service: Gastroenterology;  Laterality: N/A;    ESOPHAGOGASTRODUODENOSCOPY N/A 2022    Procedure: EGD (ESOPHAGOGASTRODUODENOSCOPY);  Surgeon: TRACEY Kuhn MD;  Location: [facility] ENDOSCOPY;  Service: Gastroenterology;  Laterality: N/A;    INTRALUMINAL GASTROINTESTINAL TRACT IMAGING VIA CAPSULE N/A 2022    Procedure: IMAGING PROCEDURE, GI TRACT, INTRALUMINAL, VIA CAPSULE;  Surgeon: TRACEY Kuhn MD;  Location: [facility] ENDOSCOPY;  Service: Gastroenterology;  Laterality: N/A;

## 2024-10-01 LAB
BACTERIA UR CULT: ABNORMAL
BACTERIA UR CULT: ABNORMAL

## 2024-10-01 NOTE — H&P
[facility] - Emergency Dept (OB)  Obstetrics  History & Physical    Patient Name: Alexa Painting  MRN: [id number]  Admission Date: 2024  Primary Care Provider: Iesha Thomas FNP    Subjective:     Principal Problem:Threatened  labor, third trimester    History of Present Illness:  29 yr  @ 31 wk 2 d presenting with spotting and passage of a small clot.  Preg complicated by 3 prior Csections.      Past Medical History:   Diagnosis Date    Abnormal Pap smear of cervix     Anemia, unspecified      Past Surgical History:   Procedure Laterality Date     SECTION      COLONOSCOPY N/A 2022    Procedure: COLON;  Surgeon: TRACEY Kuhn MD;  Location: [facility] ENDOSCOPY;  Service: Gastroenterology;  Laterality: N/A;    ESOPHAGOGASTRODUODENOSCOPY N/A 2022    Procedure: EGD (ESOPHAGOGASTRODUODENOSCOPY);  Surgeon: TRACEY Kuhn MD;  Location: [facility] ENDOSCOPY;  Service: Gastroenterology;  Laterality: N/A;    INTRALUMINAL GASTROINTESTINAL TRACT IMAGING VIA CAPSULE N/A 2022    Procedure: IMAGING PROCEDURE, GI TRACT, INTRALUMINAL, VIA CAPSULE;  Surgeon: TRACEY Kuhn MD;  Location: [facility] ENDOSCOPY;  Service: Gastroenterology;  Laterality: N/A;         Obstetric HPI:  Patient reports pelvic pressure, active fetal movement, Yes vaginal bleeding , No loss of fluid     This pregnancy has been complicated by 3 prior Csections.    OB History    Para Term  AB Living   4 3 3 0 0 0   SAB IAB Ectopic Multiple Live Births   0 0 0 0 0      # Outcome Date GA Lbr Rico/2nd Weight Sex Type Anes PTL Lv   4 Current            3 Term      CS-LTranv      2 Term      CS-LTranv      1 Term      CS-LTranv        Past Medical History:   Diagnosis Date    Abnormal Pap smear of cervix     Anemia, unspecified      Past Surgical History:   Procedure Laterality Date     SECTION      COLONOSCOPY N/A 2022    Procedure:  COLON;  Surgeon: TRACEY Kuhn MD;  Location: [facility] ENDOSCOPY;  Service: Gastroenterology;  Laterality: N/A;    ESOPHAGOGASTRODUODENOSCOPY N/A 06/21/2022    Procedure: EGD (ESOPHAGOGASTRODUODENOSCOPY);  Surgeon: TRACEY Kuhn MD;  Location: [facility] ENDOSCOPY;  Service: Gastroenterology;  Laterality: N/A;    INTRALUMINAL GASTROINTESTINAL TRACT IMAGING VIA CAPSULE N/A 06/22/2022    Procedure: IMAGING PROCEDURE, GI TRACT, INTRALUMINAL, VIA CAPSULE;  Surgeon: TRACEY Kuhn MD;  Location: [facility] ENDOSCOPY;  Service: Gastroenterology;  Laterality: N/A;       (Not in a hospital admission)      Review of patient's allergies indicates:  No Known Allergies     Family History    None       Tobacco Use    Smoking status: Never    Smokeless tobacco: Never   Substance and Sexual Activity    Alcohol use: Not Currently     Comment: occasionally    Drug use: Never    Sexual activity: Yes     Review of Systems   Constitutional: Negative.    Respiratory: Negative.     Cardiovascular: Negative.    Gastrointestinal: Negative.    Endocrine: Negative.    Genitourinary: Negative.    Musculoskeletal: Negative.    Integumentary:  Negative.   Neurological: Negative.    Hematological: Negative.    Psychiatric/Behavioral: Negative.     Breast: negative.       Objective:     Vital Signs (Most Recent):  Temp: 97.8 °F (36.6 °C) (09/29/24 0105)  Pulse: 85 (09/29/24 0105)  Resp: 17 (09/29/24 0105)  BP: 127/76 (09/29/24 0105)  SpO2: 97 % (09/29/24 0105) Vital Signs (24h Range):  Temp:  [97.8 °F (36.6 °C)] 97.8 °F (36.6 °C)  Pulse:  [85] 85  Resp:  [17] 17  SpO2:  [97 %] 97 %  BP: (127)/(76) 127/76     Weight: 132.9 kg (293 lb)  Body mass index is 57.22 kg/m².    FHT: 135, reactive Cat 1 (reassuring)  TOCO:  No ctx's noted     Physical Exam:   Constitutional: She is oriented to person, place, and time. She appears well-developed and well-nourished.      Neck: No thyromegaly present.     Pulmonary/Chest: Effort  normal. No respiratory distress.        Abdominal: Soft. She exhibits no distension and no mass. There is no abdominal tenderness. There is no rebound and no guarding.                 Neurological: She is alert and oriented to person, place, and time. No cranial nerve deficit. Coordination normal.     Psychiatric: She has a normal mood and affect. Her behavior is normal. Judgment and thought content normal.        Cervix:  Dilation:  0  Effacement:  50%  Station: -3  Presentation: Vertex     Significant Labs:  Lab Results   Component Value Date    GROUPTRH B POS 2022    HEPBSAG Nonreactive 2023       I have personallly reviewed all pertinent lab results from the last 24 hours.  Assessment/Plan:     29 y.o. female  at 31w4d for:    * Threatened  labor, third trimester  No current bleeding, no evidence of labor.  Maternal and fetal status reassuring.    31 weeks gestation of pregnancy  No current bleeding, no evidence of labor.  Maternal and fetal status reassuring.    History of  section complicating pregnancy  No current bleeding, no evidence of labor.  Maternal and fetal status reassuring.        Edwin Kendall MD  Obstetrics  [facility] - Emergency Dept (OB)

## 2024-11-07 LAB — PRENATAL STREP B CULTURE: NEGATIVE

## 2024-11-14 ENCOUNTER — ANESTHESIA EVENT (OUTPATIENT)
Dept: OBSTETRICS AND GYNECOLOGY | Facility: HOSPITAL | Age: 29
End: 2024-11-14
Payer: MEDICAID

## 2024-11-20 NOTE — ANESTHESIA PREPROCEDURE EVALUATION
"                                                                                                             2024  Alexa Painting is a 29 y.o., female,  , EGA 39 wks , who presents for a repeat C/S.    HT: 5'0"    LAB:      Pre-op Assessment    I have reviewed the Patient Summary Reports.     I have reviewed the Nursing Notes. I have reviewed the NPO Status.   I have reviewed the Medications.     Review of Systems  Anesthesia Hx:  No problems with previous Anesthesia             Denies Family Hx of Anesthesia complications.    Denies Personal Hx of Anesthesia complications.                    Social:  Non-Smoker       Cardiovascular:  Cardiovascular Normal                                              Pulmonary:  Pulmonary Normal                       Endocrine:        Morbid Obesity / BMI > 40      Physical Exam  General: Alert and Oriented  Gravid, morbid obesity  Airway:  Mallampati: III   Mouth Opening: Normal  TM Distance: Normal  Tongue: Normal  Neck ROM: Normal ROM    Dental:  Intact    Chest/Lungs:  Normal Respiratory Rate    Heart:  Rate: Normal  Rhythm: Regular Rhythm        Anesthesia Plan  Type of Anesthesia, risks & benefits discussed:    Anesthesia Type: Spinal  Intra-op Monitoring Plan: Standard ASA Monitors  Post Op Pain Control Plan: intrathecal opioid  Informed Consent: Informed consent signed with the Patient and all parties understand the risks and agree with anesthesia plan.  All questions answered. Patient consented to blood products? Yes  ASA Score: 2  Day of Surgery Review of History & Physical: H&P Update referred to the surgeon/provider.  Anesthesia Plan Notes: LR Bolus prior to spinal block.  Plan for antiemetics during spinal placement and IV ofirmev after fetal delivery.    Ready For Surgery From Anesthesia Perspective.     .      "

## 2024-11-21 ENCOUNTER — HOSPITAL ENCOUNTER (INPATIENT)
Facility: HOSPITAL | Age: 29
LOS: 3 days | Discharge: HOME | End: 2024-11-24
Attending: OBSTETRICS & GYNECOLOGY | Admitting: OBSTETRICS & GYNECOLOGY
Payer: MEDICAID

## 2024-11-21 ENCOUNTER — ANESTHESIA (OUTPATIENT)
Dept: OBSTETRICS AND GYNECOLOGY | Facility: HOSPITAL | Age: 29
End: 2024-11-21
Payer: MEDICAID

## 2024-11-21 DIAGNOSIS — D62 ACUTE BLOOD LOSS ANEMIA: ICD-10-CM

## 2024-11-21 DIAGNOSIS — O99.213 SEVERE OBESITY DUE TO EXCESS CALORIES AFFECTING PREGNANCY IN THIRD TRIMESTER: ICD-10-CM

## 2024-11-21 DIAGNOSIS — O47.03 THREATENED PRETERM LABOR, THIRD TRIMESTER: ICD-10-CM

## 2024-11-21 DIAGNOSIS — O34.219 HISTORY OF CESAREAN SECTION COMPLICATING PREGNANCY: ICD-10-CM

## 2024-11-21 DIAGNOSIS — Z98.891 S/P C-SECTION: ICD-10-CM

## 2024-11-21 DIAGNOSIS — R19.5 OCCULT BLOOD POSITIVE STOOL: ICD-10-CM

## 2024-11-21 DIAGNOSIS — E66.01 SEVERE OBESITY DUE TO EXCESS CALORIES AFFECTING PREGNANCY IN THIRD TRIMESTER: ICD-10-CM

## 2024-11-21 DIAGNOSIS — Z3A.31 31 WEEKS GESTATION OF PREGNANCY: ICD-10-CM

## 2024-11-21 LAB
GROUP & RH: NORMAL
INDIRECT COOMBS: NORMAL
SPECIMEN OUTDATE: NORMAL

## 2024-11-21 PROCEDURE — 63600175 PHARM REV CODE 636 W HCPCS: Performed by: OBSTETRICS & GYNECOLOGY

## 2024-11-21 PROCEDURE — 63600175 PHARM REV CODE 636 W HCPCS: Performed by: NURSE ANESTHETIST, CERTIFIED REGISTERED

## 2024-11-21 PROCEDURE — 36004724 HC OB OR TIME LEV III - 1ST 15 MIN: Performed by: OBSTETRICS & GYNECOLOGY

## 2024-11-21 PROCEDURE — 25000003 PHARM REV CODE 250: Performed by: OBSTETRICS & GYNECOLOGY

## 2024-11-21 PROCEDURE — 63600175 PHARM REV CODE 636 W HCPCS: Performed by: ANESTHESIOLOGY

## 2024-11-21 PROCEDURE — 86850 RBC ANTIBODY SCREEN: CPT | Performed by: OBSTETRICS & GYNECOLOGY

## 2024-11-21 PROCEDURE — 37000008 HC ANESTHESIA 1ST 15 MINUTES: Performed by: OBSTETRICS & GYNECOLOGY

## 2024-11-21 PROCEDURE — 62322 NJX INTERLAMINAR LMBR/SAC: CPT | Performed by: ANESTHESIOLOGY

## 2024-11-21 PROCEDURE — 63600175 PHARM REV CODE 636 W HCPCS: Mod: JZ,JG | Performed by: ANESTHESIOLOGY

## 2024-11-21 PROCEDURE — 11000001 HC ACUTE MED/SURG PRIVATE ROOM

## 2024-11-21 PROCEDURE — 27000492 HC SLEEVE, SCD T/L

## 2024-11-21 PROCEDURE — 37000009 HC ANESTHESIA EA ADD 15 MINS: Performed by: OBSTETRICS & GYNECOLOGY

## 2024-11-21 PROCEDURE — 36415 COLL VENOUS BLD VENIPUNCTURE: CPT | Performed by: OBSTETRICS & GYNECOLOGY

## 2024-11-21 PROCEDURE — 51702 INSERT TEMP BLADDER CATH: CPT

## 2024-11-21 PROCEDURE — 71000033 HC RECOVERY, INTIAL HOUR: Performed by: OBSTETRICS & GYNECOLOGY

## 2024-11-21 PROCEDURE — 36004725 HC OB OR TIME LEV III - EA ADD 15 MIN: Performed by: OBSTETRICS & GYNECOLOGY

## 2024-11-21 PROCEDURE — 25000003 PHARM REV CODE 250: Performed by: NURSE ANESTHETIST, CERTIFIED REGISTERED

## 2024-11-21 RX ORDER — METHYLERGONOVINE MALEATE 0.2 MG/ML
200 INJECTION INTRAVENOUS
Status: DISCONTINUED | OUTPATIENT
Start: 2024-11-21 | End: 2024-11-24 | Stop reason: HOSPADM

## 2024-11-21 RX ORDER — EPHEDRINE SULFATE 50 MG/ML
INJECTION, SOLUTION INTRAVENOUS
Status: DISCONTINUED | OUTPATIENT
Start: 2024-11-21 | End: 2024-11-21

## 2024-11-21 RX ORDER — DIPHENHYDRAMINE HYDROCHLORIDE 50 MG/ML
12.5 INJECTION INTRAMUSCULAR; INTRAVENOUS
Status: DISCONTINUED | OUTPATIENT
Start: 2024-11-21 | End: 2024-11-24 | Stop reason: HOSPADM

## 2024-11-21 RX ORDER — OXYCODONE AND ACETAMINOPHEN 10; 325 MG/1; MG/1
1 TABLET ORAL EVERY 6 HOURS PRN
Status: DISCONTINUED | OUTPATIENT
Start: 2024-11-21 | End: 2024-11-22

## 2024-11-21 RX ORDER — DOCUSATE SODIUM 100 MG/1
200 CAPSULE, LIQUID FILLED ORAL 2 TIMES DAILY
Status: DISCONTINUED | OUTPATIENT
Start: 2024-11-21 | End: 2024-11-24 | Stop reason: HOSPADM

## 2024-11-21 RX ORDER — DIPHENHYDRAMINE HYDROCHLORIDE 50 MG/ML
25 INJECTION INTRAMUSCULAR; INTRAVENOUS EVERY 4 HOURS PRN
Status: DISCONTINUED | OUTPATIENT
Start: 2024-11-21 | End: 2024-11-24 | Stop reason: HOSPADM

## 2024-11-21 RX ORDER — METHYLERGONOVINE MALEATE 0.2 MG/ML
200 INJECTION INTRAVENOUS ONCE AS NEEDED
Status: DISCONTINUED | OUTPATIENT
Start: 2024-11-21 | End: 2024-11-24 | Stop reason: HOSPADM

## 2024-11-21 RX ORDER — PROCHLORPERAZINE EDISYLATE 5 MG/ML
5 INJECTION INTRAMUSCULAR; INTRAVENOUS EVERY 6 HOURS PRN
Status: DISCONTINUED | OUTPATIENT
Start: 2024-11-21 | End: 2024-11-24 | Stop reason: HOSPADM

## 2024-11-21 RX ORDER — CARBOPROST TROMETHAMINE 250 UG/ML
250 INJECTION, SOLUTION INTRAMUSCULAR
Status: DISCONTINUED | OUTPATIENT
Start: 2024-11-21 | End: 2024-11-24 | Stop reason: HOSPADM

## 2024-11-21 RX ORDER — SIMETHICONE 80 MG
1 TABLET,CHEWABLE ORAL EVERY 4 HOURS PRN
Status: DISCONTINUED | OUTPATIENT
Start: 2024-11-21 | End: 2024-11-24 | Stop reason: HOSPADM

## 2024-11-21 RX ORDER — PHENYLEPHRINE HYDROCHLORIDE 10 MG/ML
INJECTION INTRAVENOUS
Status: DISCONTINUED | OUTPATIENT
Start: 2024-11-21 | End: 2024-11-21

## 2024-11-21 RX ORDER — MORPHINE SULFATE 0.5 MG/ML
INJECTION, SOLUTION EPIDURAL; INTRATHECAL; INTRAVENOUS
Status: DISCONTINUED | OUTPATIENT
Start: 2024-11-21 | End: 2024-11-21

## 2024-11-21 RX ORDER — KETOROLAC TROMETHAMINE 30 MG/ML
INJECTION, SOLUTION INTRAMUSCULAR; INTRAVENOUS
Status: DISCONTINUED | OUTPATIENT
Start: 2024-11-21 | End: 2024-11-21

## 2024-11-21 RX ORDER — KETOROLAC TROMETHAMINE 30 MG/ML
30 INJECTION, SOLUTION INTRAMUSCULAR; INTRAVENOUS EVERY 6 HOURS
Status: ACTIVE | OUTPATIENT
Start: 2024-11-21 | End: 2024-11-22

## 2024-11-21 RX ORDER — ONDANSETRON HYDROCHLORIDE 2 MG/ML
4 INJECTION, SOLUTION INTRAVENOUS EVERY 6 HOURS PRN
Status: DISCONTINUED | OUTPATIENT
Start: 2024-11-21 | End: 2024-11-24 | Stop reason: HOSPADM

## 2024-11-21 RX ORDER — ACETAMINOPHEN 10 MG/ML
INJECTION, SOLUTION INTRAVENOUS
Status: DISCONTINUED | OUTPATIENT
Start: 2024-11-21 | End: 2024-11-21

## 2024-11-21 RX ORDER — ADHESIVE BANDAGE
30 BANDAGE TOPICAL 2 TIMES DAILY PRN
Status: DISCONTINUED | OUTPATIENT
Start: 2024-11-22 | End: 2024-11-24 | Stop reason: HOSPADM

## 2024-11-21 RX ORDER — KETOROLAC TROMETHAMINE 30 MG/ML
30 INJECTION, SOLUTION INTRAMUSCULAR; INTRAVENOUS EVERY 6 HOURS
Status: DISPENSED | OUTPATIENT
Start: 2024-11-21 | End: 2024-11-22

## 2024-11-21 RX ORDER — OXYTOCIN 10 [USP'U]/ML
10 INJECTION, SOLUTION INTRAMUSCULAR; INTRAVENOUS ONCE AS NEEDED
Status: DISCONTINUED | OUTPATIENT
Start: 2024-11-21 | End: 2024-11-24 | Stop reason: HOSPADM

## 2024-11-21 RX ORDER — SODIUM CHLORIDE, SODIUM LACTATE, POTASSIUM CHLORIDE, CALCIUM CHLORIDE 600; 310; 30; 20 MG/100ML; MG/100ML; MG/100ML; MG/100ML
INJECTION, SOLUTION INTRAVENOUS CONTINUOUS
Status: DISCONTINUED | OUTPATIENT
Start: 2024-11-21 | End: 2024-11-24 | Stop reason: HOSPADM

## 2024-11-21 RX ORDER — CEFAZOLIN SODIUM 1 G/3ML
INJECTION, POWDER, FOR SOLUTION INTRAMUSCULAR; INTRAVENOUS
Status: DISCONTINUED | OUTPATIENT
Start: 2024-11-21 | End: 2024-11-21

## 2024-11-21 RX ORDER — MUPIROCIN 20 MG/G
OINTMENT TOPICAL
Status: CANCELLED | OUTPATIENT
Start: 2024-11-21

## 2024-11-21 RX ORDER — FAMOTIDINE 10 MG/ML
20 INJECTION INTRAVENOUS
Status: DISCONTINUED | OUTPATIENT
Start: 2024-11-21 | End: 2024-11-24 | Stop reason: HOSPADM

## 2024-11-21 RX ORDER — OXYTOCIN/0.9 % SODIUM CHLORIDE 15/250 ML
PLASTIC BAG, INJECTION (ML) INTRAVENOUS
Status: DISCONTINUED | OUTPATIENT
Start: 2024-11-21 | End: 2024-11-21

## 2024-11-21 RX ORDER — ACETAMINOPHEN 325 MG/1
650 TABLET ORAL EVERY 4 HOURS PRN
Status: DISCONTINUED | OUTPATIENT
Start: 2024-11-21 | End: 2024-11-24 | Stop reason: HOSPADM

## 2024-11-21 RX ORDER — EPHEDRINE SULFATE 50 MG/ML
10 INJECTION, SOLUTION INTRAVENOUS
Status: DISCONTINUED | OUTPATIENT
Start: 2024-11-21 | End: 2024-11-24 | Stop reason: HOSPADM

## 2024-11-21 RX ORDER — DEXAMETHASONE SODIUM PHOSPHATE 4 MG/ML
INJECTION, SOLUTION INTRA-ARTICULAR; INTRALESIONAL; INTRAMUSCULAR; INTRAVENOUS; SOFT TISSUE
Status: DISCONTINUED | OUTPATIENT
Start: 2024-11-21 | End: 2024-11-21

## 2024-11-21 RX ORDER — OXYTOCIN/0.9 % SODIUM CHLORIDE 15/250 ML
95 PLASTIC BAG, INJECTION (ML) INTRAVENOUS CONTINUOUS PRN
Status: DISCONTINUED | OUTPATIENT
Start: 2024-11-21 | End: 2024-11-24 | Stop reason: HOSPADM

## 2024-11-21 RX ORDER — ONDANSETRON HYDROCHLORIDE 2 MG/ML
INJECTION, SOLUTION INTRAVENOUS
Status: DISCONTINUED | OUTPATIENT
Start: 2024-11-21 | End: 2024-11-21

## 2024-11-21 RX ORDER — BUPIVACAINE HYDROCHLORIDE 7.5 MG/ML
INJECTION, SOLUTION EPIDURAL; RETROBULBAR
Status: COMPLETED | OUTPATIENT
Start: 2024-11-21 | End: 2024-11-21

## 2024-11-21 RX ORDER — OXYTOCIN/0.9 % SODIUM CHLORIDE 15/250 ML
95 PLASTIC BAG, INJECTION (ML) INTRAVENOUS ONCE AS NEEDED
Status: DISCONTINUED | OUTPATIENT
Start: 2024-11-21 | End: 2024-11-24 | Stop reason: HOSPADM

## 2024-11-21 RX ORDER — NALOXONE HCL 0.4 MG/ML
0.04 VIAL (ML) INJECTION EVERY 5 MIN PRN
Status: DISCONTINUED | OUTPATIENT
Start: 2024-11-21 | End: 2024-11-24 | Stop reason: HOSPADM

## 2024-11-21 RX ORDER — ACETAMINOPHEN 325 MG/1
650 TABLET ORAL EVERY 6 HOURS
Status: ACTIVE | OUTPATIENT
Start: 2024-11-21 | End: 2024-11-22

## 2024-11-21 RX ORDER — ONDANSETRON HYDROCHLORIDE 2 MG/ML
4 INJECTION, SOLUTION INTRAVENOUS EVERY 6 HOURS PRN
Status: ACTIVE | OUTPATIENT
Start: 2024-11-21 | End: 2024-11-22

## 2024-11-21 RX ORDER — SODIUM CITRATE AND CITRIC ACID MONOHYDRATE 334; 500 MG/5ML; MG/5ML
30 SOLUTION ORAL
Status: DISCONTINUED | OUTPATIENT
Start: 2024-11-21 | End: 2024-11-24 | Stop reason: HOSPADM

## 2024-11-21 RX ORDER — DIPHENOXYLATE HYDROCHLORIDE AND ATROPINE SULFATE 2.5; .025 MG/1; MG/1
2 TABLET ORAL EVERY 6 HOURS PRN
Status: DISCONTINUED | OUTPATIENT
Start: 2024-11-21 | End: 2024-11-24 | Stop reason: HOSPADM

## 2024-11-21 RX ORDER — FENTANYL CITRATE 50 UG/ML
INJECTION, SOLUTION INTRAMUSCULAR; INTRAVENOUS
Status: DISCONTINUED | OUTPATIENT
Start: 2024-11-21 | End: 2024-11-21

## 2024-11-21 RX ORDER — OXYCODONE HYDROCHLORIDE 5 MG/1
5 TABLET ORAL EVERY 4 HOURS PRN
Status: ACTIVE | OUTPATIENT
Start: 2024-11-21 | End: 2024-11-22

## 2024-11-21 RX ORDER — HYDROMORPHONE HYDROCHLORIDE 2 MG/ML
1 INJECTION, SOLUTION INTRAMUSCULAR; INTRAVENOUS; SUBCUTANEOUS EVERY 4 HOURS PRN
Status: DISCONTINUED | OUTPATIENT
Start: 2024-11-21 | End: 2024-11-24 | Stop reason: HOSPADM

## 2024-11-21 RX ORDER — OXYTOCIN-SODIUM CHLORIDE 0.9% IV SOLN 30 UNIT/500ML 30-0.9/5 UT/ML-%
10 SOLUTION INTRAVENOUS ONCE AS NEEDED
Status: DISCONTINUED | OUTPATIENT
Start: 2024-11-21 | End: 2024-11-24 | Stop reason: HOSPADM

## 2024-11-21 RX ORDER — MISOPROSTOL 100 UG/1
800 TABLET ORAL
Status: DISCONTINUED | OUTPATIENT
Start: 2024-11-21 | End: 2024-11-24 | Stop reason: HOSPADM

## 2024-11-21 RX ORDER — ONDANSETRON 4 MG/1
8 TABLET, ORALLY DISINTEGRATING ORAL EVERY 8 HOURS PRN
Status: DISCONTINUED | OUTPATIENT
Start: 2024-11-21 | End: 2024-11-24 | Stop reason: HOSPADM

## 2024-11-21 RX ORDER — FAMOTIDINE 10 MG/ML
20 INJECTION INTRAVENOUS ONCE
Status: DISCONTINUED | OUTPATIENT
Start: 2024-11-21 | End: 2024-11-24 | Stop reason: HOSPADM

## 2024-11-21 RX ORDER — BISACODYL 10 MG/1
10 SUPPOSITORY RECTAL ONCE AS NEEDED
Status: DISCONTINUED | OUTPATIENT
Start: 2024-11-21 | End: 2024-11-24 | Stop reason: HOSPADM

## 2024-11-21 RX ORDER — SODIUM CITRATE AND CITRIC ACID MONOHYDRATE 334; 500 MG/5ML; MG/5ML
30 SOLUTION ORAL ONCE
Status: DISCONTINUED | OUTPATIENT
Start: 2024-11-21 | End: 2024-11-24 | Stop reason: HOSPADM

## 2024-11-21 RX ORDER — OXYCODONE HYDROCHLORIDE 5 MG/1
10 TABLET ORAL EVERY 4 HOURS PRN
Status: ACTIVE | OUTPATIENT
Start: 2024-11-21 | End: 2024-11-22

## 2024-11-21 RX ORDER — DEXTROSE, SODIUM CHLORIDE, SODIUM LACTATE, POTASSIUM CHLORIDE, AND CALCIUM CHLORIDE 5; .6; .31; .03; .02 G/100ML; G/100ML; G/100ML; G/100ML; G/100ML
INJECTION, SOLUTION INTRAVENOUS CONTINUOUS
Status: DISCONTINUED | OUTPATIENT
Start: 2024-11-21 | End: 2024-11-24 | Stop reason: HOSPADM

## 2024-11-21 RX ORDER — OXYCODONE AND ACETAMINOPHEN 5; 325 MG/1; MG/1
1 TABLET ORAL EVERY 6 HOURS PRN
Status: DISCONTINUED | OUTPATIENT
Start: 2024-11-21 | End: 2024-11-22

## 2024-11-21 RX ORDER — DIPHENHYDRAMINE HCL 25 MG
25 CAPSULE ORAL EVERY 4 HOURS PRN
Status: DISCONTINUED | OUTPATIENT
Start: 2024-11-21 | End: 2024-11-24 | Stop reason: HOSPADM

## 2024-11-21 RX ORDER — IBUPROFEN 600 MG/1
600 TABLET ORAL EVERY 6 HOURS
Status: DISCONTINUED | OUTPATIENT
Start: 2024-11-22 | End: 2024-11-24 | Stop reason: HOSPADM

## 2024-11-21 RX ORDER — CEFAZOLIN SODIUM 1 G/3ML
2 INJECTION, POWDER, FOR SOLUTION INTRAMUSCULAR; INTRAVENOUS
Status: DISCONTINUED | OUTPATIENT
Start: 2024-11-21 | End: 2024-11-24 | Stop reason: HOSPADM

## 2024-11-21 RX ADMIN — SODIUM CHLORIDE, POTASSIUM CHLORIDE, SODIUM LACTATE AND CALCIUM CHLORIDE 1000 ML: 600; 310; 30; 20 INJECTION, SOLUTION INTRAVENOUS at 09:11

## 2024-11-21 RX ADMIN — PHENYLEPHRINE HYDROCHLORIDE 100 MCG: 10 INJECTION INTRAVENOUS at 12:11

## 2024-11-21 RX ADMIN — EPHEDRINE SULFATE 10 MG: 50 INJECTION INTRAVENOUS at 01:11

## 2024-11-21 RX ADMIN — BUPIVACAINE HYDROCHLORIDE 1.6 ML: 7.5 INJECTION, SOLUTION EPIDURAL; RETROBULBAR at 12:11

## 2024-11-21 RX ADMIN — PHENYLEPHRINE HYDROCHLORIDE 100 MCG: 10 INJECTION INTRAVENOUS at 01:11

## 2024-11-21 RX ADMIN — TRANEXAMIC ACID 1000 MG: 100 INJECTION INTRAVENOUS at 04:11

## 2024-11-21 RX ADMIN — SODIUM CHLORIDE, POTASSIUM CHLORIDE, SODIUM LACTATE AND CALCIUM CHLORIDE: 600; 310; 30; 20 INJECTION, SOLUTION INTRAVENOUS at 09:11

## 2024-11-21 RX ADMIN — DEXAMETHASONE SODIUM PHOSPHATE 4 MG: 4 INJECTION, SOLUTION INTRA-ARTICULAR; INTRALESIONAL; INTRAMUSCULAR; INTRAVENOUS; SOFT TISSUE at 01:11

## 2024-11-21 RX ADMIN — SODIUM CITRATE AND CITRIC ACID MONOHYDRATE 30 ML: 500; 334 SOLUTION ORAL at 11:11

## 2024-11-21 RX ADMIN — SODIUM CHLORIDE, POTASSIUM CHLORIDE, SODIUM LACTATE AND CALCIUM CHLORIDE: 600; 310; 30; 20 INJECTION, SOLUTION INTRAVENOUS at 12:11

## 2024-11-21 RX ADMIN — Medication 250 ML: at 01:11

## 2024-11-21 RX ADMIN — FENTANYL CITRATE 10 MCG: 50 INJECTION, SOLUTION INTRAMUSCULAR; INTRAVENOUS at 12:11

## 2024-11-21 RX ADMIN — CEFAZOLIN 2 G: 330 INJECTION, POWDER, FOR SOLUTION INTRAMUSCULAR; INTRAVENOUS at 12:11

## 2024-11-21 RX ADMIN — ONDANSETRON 8 MG: 2 INJECTION INTRAMUSCULAR; INTRAVENOUS at 12:11

## 2024-11-21 RX ADMIN — HYDROMORPHONE HYDROCHLORIDE 1 MG: 2 INJECTION, SOLUTION INTRAMUSCULAR; INTRAVENOUS; SUBCUTANEOUS at 04:11

## 2024-11-21 RX ADMIN — ACETAMINOPHEN 1000 MG: 10 INJECTION, SOLUTION INTRAVENOUS at 01:11

## 2024-11-21 RX ADMIN — KETOROLAC TROMETHAMINE 30 MG: 30 INJECTION, SOLUTION INTRAMUSCULAR at 10:11

## 2024-11-21 RX ADMIN — KETOROLAC TROMETHAMINE 30 MG: 30 INJECTION, SOLUTION INTRAMUSCULAR; INTRAVENOUS at 01:11

## 2024-11-21 RX ADMIN — MORPHINE SULFATE 0.1 MG: 0.5 INJECTION, SOLUTION EPIDURAL; INTRATHECAL; INTRAVENOUS at 12:11

## 2024-11-21 NOTE — H&P
Patient ID: Alexa Painting is a 29 y.o. female.    Chief Complaint: Scheduled       HPI:  HPI repeat cs    Review of Systems nl 12 point review    Current Facility-Administered Medications   Medication Dose Route Frequency Provider Last Rate Last Admin    carboprost injection 250 mcg  250 mcg Intramuscular On Call Procedure Eugene Marcano Jr., MD        carboprost injection 250 mcg  250 mcg Intramuscular Q15 Min PRN Eugene Marcano Jr., MD        ceFAZolin injection 2 g  2 g Intravenous On Call Procedure Eugene Marcano Jr., MD        diphenoxylate-atropine 2.5-0.025 mg per tablet 2 tablet  2 tablet Oral Q6H PRN Eugene Marcano Jr., MD        famotidine (PF) injection 20 mg  20 mg Intravenous On Call Procedure Eugene Marcano Jr., MD        lactated ringers infusion   Intravenous Continuous Eugene Marcano Jr.,  mL/hr at 24 0901 New Bag at 24 0901    methylergonovine injection 200 mcg  200 mcg Intramuscular On Call Procedure Eugene Marcano Jr., MD        methylergonovine injection 200 mcg  200 mcg Intramuscular Once PRN Eugene Marcano Jr., MD        miSOPROStoL tablet 800 mcg  800 mcg Rectal On Call Procedure Eugene Marcano Jr., MD        ondansetron disintegrating tablet 8 mg  8 mg Oral Q8H PRN Eugene Marcano Jr., MD        ondansetron injection 4 mg  4 mg Intravenous Q6H PRN Eugene Marcano Jr., MD        oxytocin 15 units/250 mL (60 milliunits/mL) in 0.9% NaCl infusion (non-titrating)  95 bibiana-units/min Intravenous Once PRN Eugene Marcano Jr., MD        oxytocin injection 10 Units  10 Units Intramuscular Once PRN Eugene Marcano Jr., MD        sodium citrate-citric acid 500-334 mg/5 ml solution 30 mL  30 mL Oral On Call Procedure Eugene Marcano Jr., MD   30 mL at 24 1143    tranexamic acid (CYKLOKAPRON) 1,000 mg in 0.9% NaCl 100 mL IVPB (MB+)  1,000 mg Intravenous Q30 Min PRN Eugene Marcano Jr., MD           Review of patient's allergies indicates:  No Known  Allergies    Past Medical History:   Diagnosis Date    Abnormal Pap smear of cervix     Anemia, unspecified     HSV infection        Past Surgical History:   Procedure Laterality Date     SECTION      COLONOSCOPY N/A 2022    Procedure: COLON;  Surgeon: TRACEY Kuhn MD;  Location: [facility] ENDOSCOPY;  Service: Gastroenterology;  Laterality: N/A;    ESOPHAGOGASTRODUODENOSCOPY N/A 2022    Procedure: EGD (ESOPHAGOGASTRODUODENOSCOPY);  Surgeon: TRACEY Kuhn MD;  Location: [facility] ENDOSCOPY;  Service: Gastroenterology;  Laterality: N/A;    INTRALUMINAL GASTROINTESTINAL TRACT IMAGING VIA CAPSULE N/A 2022    Procedure: IMAGING PROCEDURE, GI TRACT, INTRALUMINAL, VIA CAPSULE;  Surgeon: TRAECY Kuhn MD;  Location: [facility] ENDOSCOPY;  Service: Gastroenterology;  Laterality: N/A;       Social History     Socioeconomic History    Marital status: Single   Tobacco Use    Smoking status: Never    Smokeless tobacco: Never   Substance and Sexual Activity    Alcohol use: Not Currently     Comment: occasionally    Drug use: Never    Sexual activity: Yes       Vitals:    24 0828   BP: 119/71   Pulse: 75   Temp: 97.7 °F (36.5 °C)       Physical Examwnl    Assessment & Plan:  Cs today

## 2024-11-21 NOTE — ANESTHESIA PROCEDURE NOTES
Spinal    Diagnosis: IUP / Term Gestation / previous C/S  Patient location during procedure: OB  Start time: 11/21/2024 12:39 PM  Timeout: 11/21/2024 12:38 PM  End time: 11/21/2024 12:47 PM    Staffing  Authorizing Provider: David Dao MD  Performing Provider: David Dao MD    Staffing  Performed by: David Dao MD  Authorized by: David Dao MD    Preanesthetic Checklist  Completed: patient identified, IV checked, site marked, risks and benefits discussed, surgical consent, monitors and equipment checked, pre-op evaluation and timeout performed  Spinal Block  Patient position: sitting  Prep: ChloraPrep  Patient monitoring: heart rate, cardiac monitor, continuous pulse ox and frequent blood pressure checks  Approach: midline  Location: L3-4  Injection technique: single shot  Needle  Needle type: pencil-tip   Needle gauge: 25 G  Needle length: 3.5 in  Additional Documentation: incremental injection, negative aspiration for heme and no paresthesia on injection  Needle localization: anatomical landmarks  Assessment  Sensory level: T5   Dermatomal levels determined by pinch or prick  Ease of block: moderate  Patient's tolerance of the procedure: comfortable throughout block and no complaints  Medications:    Medications: bupivacaine (pf) (MARCAINE) injection 0.75% - Intraspinal   1.6 mL - 11/21/2024 12:47:00 PM

## 2024-11-21 NOTE — TRANSFER OF CARE
Anesthesia Transfer of Care Note    Patient: Alexa Painting    Procedure(s) Performed: Procedure(s) (LRB):   SECTION (N/A)    Patient location: Labor and Delivery    Anesthesia Type: spinal    Transport from OR: Transported from OR on room air with adequate spontaneous ventilation    Post pain: adequate analgesia    Post assessment: no apparent anesthetic complications and tolerated procedure well    Post vital signs: stable    Level of consciousness: awake, alert and oriented    Nausea/Vomiting: no nausea/vomiting    Complications: none    Transfer of care protocol was followed      Last vitals: Visit Vitals  /74 (BP Location: Right arm, Patient Position: Lying)   Pulse 82   Temp 36.6 °C (97.9 °F) (Oral)   Resp 12   Ht 5' (1.524 m)   Wt 92.1 kg (203 lb)   LMP 2024   SpO2 98%   Breastfeeding No   BMI 39.65 kg/m²

## 2024-11-22 LAB
BASOPHILS # BLD AUTO: 0.02 X10(3)/MCL
BASOPHILS NFR BLD AUTO: 0.1 %
EOSINOPHIL # BLD AUTO: 0.01 X10(3)/MCL (ref 0–0.9)
EOSINOPHIL NFR BLD AUTO: 0.1 %
ERYTHROCYTE [DISTWIDTH] IN BLOOD BY AUTOMATED COUNT: 14.3 % (ref 11.5–17)
HCT VFR BLD AUTO: 28.4 % (ref 37–47)
HGB BLD-MCNC: 9.2 G/DL (ref 12–16)
IMM GRANULOCYTES # BLD AUTO: 0.09 X10(3)/MCL (ref 0–0.04)
IMM GRANULOCYTES NFR BLD AUTO: 0.6 %
LYMPHOCYTES # BLD AUTO: 1.55 X10(3)/MCL (ref 0.6–4.6)
LYMPHOCYTES NFR BLD AUTO: 10 %
MCH RBC QN AUTO: 25.1 PG (ref 27–31)
MCHC RBC AUTO-ENTMCNC: 32.4 G/DL (ref 33–36)
MCV RBC AUTO: 77.6 FL (ref 80–94)
MONOCYTES # BLD AUTO: 1.79 X10(3)/MCL (ref 0.1–1.3)
MONOCYTES NFR BLD AUTO: 11.6 %
NEUTROPHILS # BLD AUTO: 11.98 X10(3)/MCL (ref 2.1–9.2)
NEUTROPHILS NFR BLD AUTO: 77.6 %
NRBC BLD AUTO-RTO: 0 %
PLATELET # BLD AUTO: 186 X10(3)/MCL (ref 130–400)
PLATELETS.RETICULATED NFR BLD AUTO: 13.8 % (ref 0.9–11.2)
PMV BLD AUTO: 12.8 FL (ref 7.4–10.4)
RBC # BLD AUTO: 3.66 X10(6)/MCL (ref 4.2–5.4)
WBC # BLD AUTO: 15.44 X10(3)/MCL (ref 4.5–11.5)

## 2024-11-22 PROCEDURE — 25000003 PHARM REV CODE 250: Performed by: ANESTHESIOLOGY

## 2024-11-22 PROCEDURE — 11000001 HC ACUTE MED/SURG PRIVATE ROOM

## 2024-11-22 PROCEDURE — 36415 COLL VENOUS BLD VENIPUNCTURE: CPT | Performed by: OBSTETRICS & GYNECOLOGY

## 2024-11-22 PROCEDURE — 25000003 PHARM REV CODE 250: Performed by: OBSTETRICS & GYNECOLOGY

## 2024-11-22 PROCEDURE — 63600175 PHARM REV CODE 636 W HCPCS: Performed by: ANESTHESIOLOGY

## 2024-11-22 PROCEDURE — 85025 COMPLETE CBC W/AUTO DIFF WBC: CPT | Performed by: OBSTETRICS & GYNECOLOGY

## 2024-11-22 RX ORDER — OXYCODONE HYDROCHLORIDE 5 MG/1
10 TABLET ORAL EVERY 4 HOURS PRN
Status: DISPENSED | OUTPATIENT
Start: 2024-11-22 | End: 2024-11-23

## 2024-11-22 RX ORDER — OXYCODONE AND ACETAMINOPHEN 10; 325 MG/1; MG/1
1 TABLET ORAL EVERY 6 HOURS PRN
Qty: 28 TABLET | Refills: 0 | Status: SHIPPED | OUTPATIENT
Start: 2024-11-22

## 2024-11-22 RX ORDER — OXYCODONE HYDROCHLORIDE 5 MG/1
5 TABLET ORAL EVERY 4 HOURS PRN
Status: ACTIVE | OUTPATIENT
Start: 2024-11-22 | End: 2024-11-23

## 2024-11-22 RX ORDER — OXYCODONE AND ACETAMINOPHEN 5; 325 MG/1; MG/1
1 TABLET ORAL EVERY 4 HOURS PRN
Status: DISCONTINUED | OUTPATIENT
Start: 2024-11-22 | End: 2024-11-22

## 2024-11-22 RX ORDER — OXYCODONE AND ACETAMINOPHEN 10; 325 MG/1; MG/1
1 TABLET ORAL EVERY 4 HOURS PRN
Status: DISCONTINUED | OUTPATIENT
Start: 2024-11-22 | End: 2024-11-22

## 2024-11-22 RX ADMIN — ACETAMINOPHEN 650 MG: 325 TABLET, FILM COATED ORAL at 05:11

## 2024-11-22 RX ADMIN — KETOROLAC TROMETHAMINE 30 MG: 30 INJECTION, SOLUTION INTRAMUSCULAR at 04:11

## 2024-11-22 RX ADMIN — OXYCODONE AND ACETAMINOPHEN 1 TABLET: 10; 325 TABLET ORAL at 01:11

## 2024-11-22 RX ADMIN — SIMETHICONE 80 MG: 80 TABLET, CHEWABLE ORAL at 04:11

## 2024-11-22 RX ADMIN — DOCUSATE SODIUM 200 MG: 100 CAPSULE, LIQUID FILLED ORAL at 08:11

## 2024-11-22 RX ADMIN — OXYCODONE HYDROCHLORIDE 10 MG: 5 TABLET ORAL at 10:11

## 2024-11-22 RX ADMIN — ACETAMINOPHEN 650 MG: 325 TABLET, FILM COATED ORAL at 10:11

## 2024-11-22 RX ADMIN — MAGNESIUM HYDROXIDE 2400 MG: 400 SUSPENSION ORAL at 04:11

## 2024-11-22 RX ADMIN — IBUPROFEN 600 MG: 600 TABLET, FILM COATED ORAL at 12:11

## 2024-11-22 RX ADMIN — OXYCODONE AND ACETAMINOPHEN 1 TABLET: 10; 325 TABLET ORAL at 09:11

## 2024-11-22 RX ADMIN — DOCUSATE SODIUM 200 MG: 100 CAPSULE, LIQUID FILLED ORAL at 09:11

## 2024-11-22 RX ADMIN — OXYCODONE HYDROCHLORIDE AND ACETAMINOPHEN 1 TABLET: 5; 325 TABLET ORAL at 05:11

## 2024-11-22 RX ADMIN — OXYCODONE HYDROCHLORIDE 10 MG: 5 TABLET ORAL at 05:11

## 2024-11-22 RX ADMIN — IBUPROFEN 600 MG: 600 TABLET, FILM COATED ORAL at 05:11

## 2024-11-22 NOTE — ANESTHESIA POSTPROCEDURE EVALUATION
Anesthesia Post Evaluation    Patient: Alexa Painting    Procedure(s) Performed: Procedure(s) (LRB):   SECTION (N/A)    Final Anesthesia Type: regional (Regional comprises either epidural or spinal)      Patient location during evaluation: labor & delivery  Patient participation: Yes- Able to Participate  Post-procedure vital signs: reviewed and stable (No complaints at this time)  Pain management: adequate      Anesthetic complications: no              No complaints at this time.        Vitals Value Taken Time   /63 24 1203   Temp 36.7 °C (98.1 °F) 24 1203   Pulse 89 24 1203   Resp 14 24 1203   SpO2 98 % 24 1203         Event Time   Out of Recovery 14:40:00         Pain/Daniel Score: Pain Rating Prior to Med Admin: 4 (2024 12:07 PM)  Pain Rating Post Med Admin: 5 (2024 10:45 AM)  Daniel Score: 10 (2024  2:40 PM)

## 2024-11-22 NOTE — PROGRESS NOTES
[facility]                       [address] [place] [id number]    PATIENT NAME:       LUBNA DAMON  YOB: 1995  CSN:                [id number]   MRN:                [id number]  ADMIT DATE:         2024 08:10:00  PHYSICIAN:          Eugene Marcano Jr, MD                            PROGRESS NOTE    DATE:      The patient is a 29-year-old  4, para 4, status post a repeat    section on  without incident.  She is without complaints.  Pain is well   controlled.  She is ambulating tolerating a clear diet without difficulty.  No   complaints are voiced.    OBJECTIVE:  VITALS:  Blood pressure this a.m. 104/69, respiration 18, pulse 60.    She has been afebrile.  HEART:  S1, S2, no murmurs.  LUNGS:  Clear.  ABDOMEN:  Soft, appropriately tender.  Incision was covered.    EXTREMITIES:  Showed trace lower extremity edema.    ASSESSMENT:  Postoperative day 1, repeat , doing well.    PLAN:  Continue management.        ______________________________  Eugene Marcano Jr, MD    DJE/AQS  DD:  2024  Time:  07:51AM  DT:  2024  Time:  08:54AM  Job #:  [id number]      PROGRESS NOTE

## 2024-11-22 NOTE — PLAN OF CARE
"  Problem: Adult Inpatient Plan of Care  Goal: Plan of Care Review  Outcome: Progressing  Goal: Patient-Specific Goal (Individualized)  Description: "I want to bottle feed my baby"  Outcome: Progressing  Goal: Absence of Hospital-Acquired Illness or Injury  Outcome: Progressing  Goal: Optimal Comfort and Wellbeing  Outcome: Progressing  Goal: Readiness for Transition of Care  Outcome: Progressing     Problem: Bariatric Environmental Safety  Goal: Safety Maintained with Care  Outcome: Progressing     Problem:  Fall Injury Risk  Goal: Absence of Fall, Infant Drop and Related Injury  Outcome: Progressing     "

## 2024-11-22 NOTE — OP NOTE
[facility]                       [address]                      [place] [address]    PATIENT NAME:      LUBNA DAMON  YOB: 1995  CSN:               [id number]  MRN:               [id number]  ADMIT DATE:        2024 08:10:00  PHYSICIAN:         Eugene Marcano Jr, MD                          OPERATIVE REPORT      DATE OF SURGERY:        SURGEON:  Eugene Marcano Jr, MD    TYPE OF DELIVERY:  Repeat  section.    DESCRIPTION OF PROCEDURE:  The patient was taken to the operating room where   spinal anesthesia was administered and found to be adequate.  Abdomen, perineum,   and vagina were prepped and draped in the usual sterile fashion.  Pfannenstiel   skin incision to the previous site was made with a knife and carried down   through to the level of the underlying fascia.  The fascial incision was then   extended laterally with the curved Schroeder scissors.  Superior aspect of the rectus   fascia was grasped with Kocher clamps and dissected off the rectus muscles both   superiorly and inferiorly.  The rectus muscles were  in midline.  The   peritoneum was identified and entered sharply.  Peritoneal incision then   extended with both sharp and blunt dissection.  Bladder blade was inserted.    Vesicouterine peritoneum was identified.  A bladder flap was created digitally.    Uterus was scored in a low transverse fashion.  It was carried out laterally   with the finger method.  Hand was inserted into the uterus.  The infant's head   was delivered without incident.  The rest of the infant was delivered in full.    Cord was clamped and cut.  Appropriate cord gases and cord blood were obtained.    The placenta was delivered manually.  Uterus then exteriorized and cleared of   all clots and debris.  The hysterotomy site was closed in 2 layers with a #1   chromic.  Excellent hemostasis was noted.  Uterus returned to the  abdomen.  The   gutters were irrigated, cleared of all clots and debris.  Hemostasis was ensured   throughout.  The peritoneum was run with a 2-0 chromic.  Rectus muscles   reapproximated in the midline.  The fascia was run with #1 Vicryl, subcuticular   tissue with a 2-0 plain gut, skin was closed with an Insorb.  The patient   tolerated the procedure well.    COUNT:  Needle, sponge, lap counts were correct x2.    BLOOD LOSS:  380.        ______________________________  MD MARIETTA Prado Jr/AQS  DD:  11/21/2024  Time:  02:49PM  DT:  11/21/2024  Time:  07:53PM  Job #:  [id number]      OPERATIVE REPORT

## 2024-11-22 NOTE — PLAN OF CARE
"  Problem: Adult Inpatient Plan of Care  Goal: Plan of Care Review  Outcome: Progressing  Goal: Patient-Specific Goal (Individualized)  Description: "I want to bottle feed my baby"  Outcome: Progressing  Goal: Absence of Hospital-Acquired Illness or Injury  Outcome: Progressing  Goal: Optimal Comfort and Wellbeing  Outcome: Progressing  Goal: Readiness for Transition of Care  Outcome: Progressing     Problem: Bariatric Environmental Safety  Goal: Safety Maintained with Care  Outcome: Progressing     Problem:  Fall Injury Risk  Goal: Absence of Fall, Infant Drop and Related Injury  Outcome: Progressing     Problem: Infection  Goal: Absence of Infection Signs and Symptoms  Outcome: Progressing     Problem: Wound  Goal: Optimal Coping  Outcome: Progressing  Goal: Optimal Functional Ability  Outcome: Progressing  Goal: Absence of Infection Signs and Symptoms  Outcome: Progressing  Goal: Improved Oral Intake  Outcome: Progressing  Goal: Optimal Pain Control and Function  Outcome: Progressing  Goal: Skin Health and Integrity  Outcome: Progressing  Goal: Optimal Wound Healing  Outcome: Progressing     Problem: Postpartum ( Delivery)  Goal: Successful Parent Role Transition  Outcome: Progressing  Goal: Hemostasis  Outcome: Progressing  Goal: Effective Bowel Elimination  Outcome: Progressing  Goal: Fluid and Electrolyte Balance  Outcome: Progressing  Goal: Absence of Infection Signs and Symptoms  Outcome: Progressing  Goal: Anesthesia/Sedation Recovery  Outcome: Progressing  Goal: Optimal Pain Control and Function  Outcome: Progressing  Goal: Nausea and Vomiting Relief  Outcome: Progressing  Goal: Effective Urinary Elimination  Outcome: Progressing  Goal: Effective Oxygenation and Ventilation  Outcome: Progressing     "

## 2024-11-23 PROCEDURE — 11000001 HC ACUTE MED/SURG PRIVATE ROOM

## 2024-11-23 PROCEDURE — 63600175 PHARM REV CODE 636 W HCPCS: Performed by: OBSTETRICS & GYNECOLOGY

## 2024-11-23 PROCEDURE — 25000003 PHARM REV CODE 250: Performed by: OBSTETRICS & GYNECOLOGY

## 2024-11-23 RX ADMIN — ACETAMINOPHEN 650 MG: 325 TABLET, FILM COATED ORAL at 08:11

## 2024-11-23 RX ADMIN — SIMETHICONE 80 MG: 80 TABLET, CHEWABLE ORAL at 08:11

## 2024-11-23 RX ADMIN — OXYCODONE HYDROCHLORIDE 10 MG: 5 TABLET ORAL at 12:11

## 2024-11-23 RX ADMIN — IBUPROFEN 600 MG: 600 TABLET, FILM COATED ORAL at 12:11

## 2024-11-23 RX ADMIN — IBUPROFEN 600 MG: 600 TABLET, FILM COATED ORAL at 05:11

## 2024-11-23 RX ADMIN — ACETAMINOPHEN 650 MG: 325 TABLET, FILM COATED ORAL at 12:11

## 2024-11-23 RX ADMIN — OXYCODONE HYDROCHLORIDE 10 MG: 5 TABLET ORAL at 08:11

## 2024-11-23 RX ADMIN — HYDROMORPHONE HYDROCHLORIDE 1 MG: 2 INJECTION, SOLUTION INTRAMUSCULAR; INTRAVENOUS; SUBCUTANEOUS at 01:11

## 2024-11-23 RX ADMIN — DOCUSATE SODIUM 200 MG: 100 CAPSULE, LIQUID FILLED ORAL at 08:11

## 2024-11-23 RX ADMIN — IBUPROFEN 600 MG: 600 TABLET, FILM COATED ORAL at 11:11

## 2024-11-23 RX ADMIN — IBUPROFEN 600 MG: 600 TABLET, FILM COATED ORAL at 06:11

## 2024-11-23 RX ADMIN — OXYCODONE HYDROCHLORIDE 10 MG: 5 TABLET ORAL at 04:11

## 2024-11-23 NOTE — PROGRESS NOTES
POD#2 S/P  section doing well  No complaints  Reports pain well controlled  Tolerating diet  Ambulating well without assistance  +flatus    Abd: incision healing well, C/D/I  VE: Nil lochia    A/P: POD#2, doing well no complaints  -prepare for discharge home tomorrow  -will follow up with Dr. Marcano within the next 1-2 weeks  -routine post op care

## 2024-11-23 NOTE — PLAN OF CARE
"  Problem: Adult Inpatient Plan of Care  Goal: Plan of Care Review  Outcome: Progressing  Goal: Patient-Specific Goal (Individualized)  Description: "I want to bottle feed my baby"  Outcome: Progressing  Goal: Absence of Hospital-Acquired Illness or Injury  Outcome: Progressing  Goal: Optimal Comfort and Wellbeing  Outcome: Progressing  Goal: Readiness for Transition of Care  Outcome: Progressing     Problem: Bariatric Environmental Safety  Goal: Safety Maintained with Care  Outcome: Progressing     Problem:  Fall Injury Risk  Goal: Absence of Fall, Infant Drop and Related Injury  Outcome: Progressing     Problem: Infection  Goal: Absence of Infection Signs and Symptoms  Outcome: Progressing     Problem:  Delivery  Goal: Bleeding is Controlled  Outcome: Progressing  Goal: Stable Fetal Wellbeing  Outcome: Progressing  Goal: Absence of Infection Signs and Symptoms  Outcome: Progressing  Goal: Effective Oxygenation and Ventilation  Outcome: Progressing     Problem: Wound  Goal: Optimal Coping  Outcome: Progressing  Goal: Optimal Functional Ability  Outcome: Progressing  Goal: Absence of Infection Signs and Symptoms  Outcome: Progressing  Goal: Improved Oral Intake  Outcome: Progressing  Goal: Optimal Pain Control and Function  Outcome: Progressing  Goal: Skin Health and Integrity  Outcome: Progressing  Goal: Optimal Wound Healing  Outcome: Progressing     Problem: Postpartum ( Delivery)  Goal: Successful Parent Role Transition  Outcome: Progressing  Goal: Hemostasis  Outcome: Progressing  Goal: Effective Bowel Elimination  Outcome: Progressing  Goal: Fluid and Electrolyte Balance  Outcome: Progressing  Goal: Absence of Infection Signs and Symptoms  Outcome: Progressing  Goal: Anesthesia/Sedation Recovery  Outcome: Progressing  Goal: Optimal Pain Control and Function  Outcome: Progressing  Goal: Nausea and Vomiting Relief  Outcome: Progressing  Goal: Effective Urinary Elimination  Outcome: " Progressing  Goal: Effective Oxygenation and Ventilation  Outcome: Progressing

## 2024-11-24 VITALS
BODY MASS INDEX: 39.85 KG/M2 | TEMPERATURE: 98 F | RESPIRATION RATE: 18 BRPM | HEART RATE: 86 BPM | WEIGHT: 203 LBS | SYSTOLIC BLOOD PRESSURE: 103 MMHG | OXYGEN SATURATION: 99 % | HEIGHT: 60 IN | DIASTOLIC BLOOD PRESSURE: 67 MMHG

## 2024-11-24 PROCEDURE — 25000003 PHARM REV CODE 250: Performed by: OBSTETRICS & GYNECOLOGY

## 2024-11-24 RX ORDER — IBUPROFEN 800 MG/1
800 TABLET ORAL EVERY 6 HOURS
Qty: 30 TABLET | Refills: 0 | Status: SHIPPED | OUTPATIENT
Start: 2024-11-24

## 2024-11-24 RX ORDER — IBUPROFEN 800 MG/1
800 TABLET ORAL 3 TIMES DAILY
Qty: 30 TABLET | Refills: 0 | Status: SHIPPED | OUTPATIENT
Start: 2024-11-24

## 2024-11-24 RX ADMIN — IBUPROFEN 600 MG: 600 TABLET, FILM COATED ORAL at 12:11

## 2024-11-24 RX ADMIN — DOCUSATE SODIUM 200 MG: 100 CAPSULE, LIQUID FILLED ORAL at 08:11

## 2024-11-24 RX ADMIN — IBUPROFEN 600 MG: 600 TABLET, FILM COATED ORAL at 05:11

## 2024-11-24 RX ADMIN — SIMETHICONE 80 MG: 80 TABLET, CHEWABLE ORAL at 04:11

## 2024-11-24 NOTE — SUBJECTIVE & OBJECTIVE
"Interval History: POD #3 s/p RLTCS    She is doing well this morning. She is tolerating a regular diet without nausea or vomiting. She is voiding spontaneously. She is ambulating. She has passed flatus, and has not a BM. Vaginal bleeding is mild. She denies fever or chills. Abdominal pain is mild and controlled with oral medications. She Is not breastfeeding.    Objective:     Vital Signs (Most Recent):  Temp: 98.1 °F (36.7 °C) (11/24/24 0754)  Pulse: 86 (11/24/24 0754)  Resp: 18 (11/23/24 1207)  BP: 103/67 (11/24/24 0754)  SpO2: 99 % (11/24/24 0754) Vital Signs (24h Range):  Temp:  [97.5 °F (36.4 °C)-98.2 °F (36.8 °C)] 98.1 °F (36.7 °C)  Pulse:  [73-86] 86  Resp:  [18] 18  SpO2:  [97 %-99 %] 99 %  BP: (103-115)/(67-75) 103/67     Weight: 92.1 kg (203 lb)  Body mass index is 39.65 kg/m².    No intake or output data in the 24 hours ending 11/24/24 1121      Significant Labs:  Lab Results   Component Value Date    GROUPTRH B POS 11/21/2024    HEPBSAG Negative 05/10/2024    STREPBCULT negative 11/07/2024     No results for input(s): "HGB", "HCT" in the last 48 hours.    I have personallly reviewed all pertinent lab results from the last 24 hours.    Physical Exam:   Constitutional: She is oriented to person, place, and time. She appears well-developed and well-nourished. No distress.    HENT:   Head: Normocephalic and atraumatic.     Neck: No thyromegaly present.     Pulmonary/Chest: Effort normal. No respiratory distress.        Abdominal: Soft. She exhibits abdominal incision (wound CDI). She exhibits no distension and no mass. There is no abdominal tenderness. There is no rebound and no guarding.             Musculoskeletal: Normal range of motion and moves all extremeties. No tenderness.       Neurological: She is alert and oriented to person, place, and time. No cranial nerve deficit. Coordination normal.    Skin: She is not diaphoretic.    Psychiatric: She has a normal mood and affect. Her behavior is normal. " Judgment and thought content normal.

## 2024-11-24 NOTE — DISCHARGE SUMMARY
"[facility] - 3rd Floor Mother/Baby Unit  Obstetrics  Discharge Summary      Patient Name: Alexa Painting  MRN: [id number]  Admission Date: 2024  Hospital Length of Stay: 3 days  Discharge Date and Time:  2024 11:26 AM  Attending Physician: Evelyn Marcano Jr., MD   Discharging Provider: Edwin Kendall MD   Primary Care Provider: Iesha Thomas FNP    HPI: No notes on file        Procedure(s) (LRB):   SECTION (N/A)     Hospital Course:   No notes on file     Consults (From admission, onward)          Status Ordering Provider     Inpatient consult to Anesthesiology  Once        Provider:  (Not yet assigned)    Acknowledged EVELYN MARCANO JR.            Final Active Diagnoses:    Diagnosis Date Noted POA    PRINCIPAL PROBLEM:  Delivery by  section using transverse incision of lower segment of uterus [O82] 2024 Yes      Problems Resolved During this Admission:        Significant Diagnostic Studies: Labs: CBC No results for input(s): "WBC", "HGB", "HCT", "PLT" in the last 48 hours.      Feeding Method: bottle    Immunizations       Date Immunization Status Dose Route/Site Given by    24 1349 MMR Incomplete 0.5 mL Subcutaneous/     24 1349 Tdap Incomplete 0.5 mL Intramuscular/             Delivery:    Episiotomy:     Lacerations:     Repair suture:     Repair # of packets:     Blood loss (ml):       Birth information:  YOB: 2026   Time of birth: 1:10 PM   Sex: female   Delivery type: , Vacuum (Extractor)   Gestational Age: 39w0d     Measurements    Weight: 3714 g  Weight (lbs): 8 lb 3 oz  Length: 48.3 cm  Length (in): 19"  Head circumference: 38.1 cm         Delivery Clinician: Delivery Providers    Delivering clinician: Evelyn Marcano Jr., MD   Provider Role    Jamari Kaur RN Registered Nurse             Additional  information:  Forceps:    Vacuum:    Breech:    Observed anomalies      Living?:     Apgars    Living " status: Living  Apgar Component Scores:  1 min.:  5 min.:  10 min.:  15 min.:  20 min.:    Skin color:  0  1       Heart rate:  2  2       Reflex irritability:  2  2       Muscle tone:  2  2       Respiratory effort:  2  2       Total:  8  9       Apgars assigned by: LOURDES ROWLEY RN         Placenta: Delivered:       appearance  Pending Diagnostic Studies:       None            Discharged Condition: good    Disposition: Home or Self Care    Follow Up:   Follow-up Information       Eugene Marcano Jr., MD. Call in 1 day(s).    Specialty: Obstetrics and Gynecology  Why: For wound re-check  Contact information:  [address] [facility] [address]  [phone]                           Patient Instructions:      Diet Adult Regular     Pelvic Rest     Lifting restrictions     Notify your health care provider if you experience any of the following:  temperature >100.4     Notify your health care provider if you experience any of the following:  persistent nausea and vomiting or diarrhea     Notify your health care provider if you experience any of the following:  severe uncontrolled pain     Notify your health care provider if you experience any of the following:  redness, tenderness, or signs of infection (pain, swelling, redness, odor or green/yellow discharge around incision site)     Notify your health care provider if you experience any of the following:  difficulty breathing or increased cough     Notify your health care provider if you experience any of the following:  severe persistent headache     Notify your health care provider if you experience any of the following:  persistent dizziness, light-headedness, or visual disturbances     Notify your health care provider if you experience any of the following:  increased confusion or weakness     Weight bearing restrictions (specify):     Medications:  Current Discharge Medication List        START taking these medications    Details   ibuprofen (ADVIL,MOTRIN)  800 MG tablet Take 1 tablet (800 mg total) by mouth every 6 (six) hours.  Qty: 30 tablet, Refills: 0    Associated Diagnoses: Delivery by  section using transverse incision of lower segment of uterus      oxyCODONE-acetaminophen (PERCOCET)  mg per tablet Take 1 tablet by mouth every 6 (six) hours as needed (moderate to severe (pain score 3-10), once tolerating oral diet and no longer receiving ofirmev).  Qty: 28 tablet, Refills: 0    Comments: Quantity prescribed more than 7 day supply? Yes, quantity medically necessary  Associated Diagnoses: S/P ; Delivery by  section using transverse incision of lower segment of uterus; 31 weeks gestation of pregnancy; History of  section complicating pregnancy; Threatened  labor, third trimester; Severe obesity due to excess calories affecting pregnancy in third trimester; Occult blood positive stool; Acute blood loss anemia           CONTINUE these medications which have NOT CHANGED    Details   ferrous gluconate (FERGON) 324 MG tablet Take 1 tablet by mouth once daily.           STOP taking these medications       metroNIDAZOLE (FLAGYL) 500 MG tablet Comments:   Reason for Stopping:         prenatal vit/iron fum/folic ac (PRENATAL 1+1 ORAL) Comments:   Reason for Stopping:               Edwin Kendall MD  Obstetrics  [facility] - 3rd Floor Mother/Baby Unit

## 2024-11-24 NOTE — PROGRESS NOTES
"[facility] - 3rd Floor Mother/Baby Unit  Obstetrics  Postpartum Progress Note    Patient Name: Alexa Painting  MRN: [id number]  Admission Date: 2024  Hospital Length of Stay: 3 days  Attending Physician: Eugene Marcano Jr., MD  Primary Care Provider: Iesha Thomas FNP    Subjective:     Principal Problem:Delivery by  section using transverse incision of lower segment of uterus    Hospital Course:  No notes on file    Interval History: POD #3 s/p RLTCS    She is doing well this morning. She is tolerating a regular diet without nausea or vomiting. She is voiding spontaneously. She is ambulating. She has passed flatus, and has not a BM. Vaginal bleeding is mild. She denies fever or chills. Abdominal pain is mild and controlled with oral medications. She Is not breastfeeding.    Objective:     Vital Signs (Most Recent):  Temp: 98.1 °F (36.7 °C) (24 0754)  Pulse: 86 (24 0754)  Resp: 18 (24 1207)  BP: 103/67 (24 0754)  SpO2: 99 % (24 0754) Vital Signs (24h Range):  Temp:  [97.5 °F (36.4 °C)-98.2 °F (36.8 °C)] 98.1 °F (36.7 °C)  Pulse:  [73-86] 86  Resp:  [18] 18  SpO2:  [97 %-99 %] 99 %  BP: (103-115)/(67-75) 103/67     Weight: 92.1 kg (203 lb)  Body mass index is 39.65 kg/m².    No intake or output data in the 24 hours ending 24 1121      Significant Labs:  Lab Results   Component Value Date    GROUPTRH B POS 2024    HEPBSAG Negative 05/10/2024    STREPBCULT negative 2024     No results for input(s): "HGB", "HCT" in the last 48 hours.    I have personallly reviewed all pertinent lab results from the last 24 hours.    Physical Exam:   Constitutional: She is oriented to person, place, and time. She appears well-developed and well-nourished. No distress.    HENT:   Head: Normocephalic and atraumatic.     Neck: No thyromegaly present.     Pulmonary/Chest: Effort normal. No respiratory distress.        Abdominal: Soft. She exhibits " abdominal incision (wound CDI). She exhibits no distension and no mass. There is no abdominal tenderness. There is no rebound and no guarding.             Musculoskeletal: Normal range of motion and moves all extremeties. No tenderness.       Neurological: She is alert and oriented to person, place, and time. No cranial nerve deficit. Coordination normal.    Skin: She is not diaphoretic.    Psychiatric: She has a normal mood and affect. Her behavior is normal. Judgment and thought content normal.       Assessment/Plan:     29 y.o. female  for:    * Delivery by  section using transverse incision of lower segment of uterus  Routine postop care; dismiss today per pt request        Disposition: As patient meets milestones, will plan to discharge today.    Edwin Kendall MD  Obstetrics  [facility] - 3rd Floor Mother/Baby Unit

## 2024-11-24 NOTE — PLAN OF CARE
"  Problem: Adult Inpatient Plan of Care  Goal: Plan of Care Review  Outcome: Progressing  Goal: Patient-Specific Goal (Individualized)  Description: "I want to bottle feed my baby"  Outcome: Progressing  Goal: Absence of Hospital-Acquired Illness or Injury  Outcome: Progressing  Goal: Optimal Comfort and Wellbeing  Outcome: Progressing  Goal: Readiness for Transition of Care  Outcome: Progressing     Problem: Bariatric Environmental Safety  Goal: Safety Maintained with Care  Outcome: Progressing     Problem:  Fall Injury Risk  Goal: Absence of Fall, Infant Drop and Related Injury  Outcome: Progressing     Problem: Infection  Goal: Absence of Infection Signs and Symptoms  Outcome: Progressing     Problem:  Delivery  Goal: Bleeding is Controlled  Outcome: Met  Goal: Stable Fetal Wellbeing  Outcome: Met  Goal: Absence of Infection Signs and Symptoms  Outcome: Met  Goal: Effective Oxygenation and Ventilation  Outcome: Met     Problem: Wound  Goal: Optimal Coping  Outcome: Progressing  Goal: Optimal Functional Ability  Outcome: Progressing  Goal: Absence of Infection Signs and Symptoms  Outcome: Progressing  Goal: Improved Oral Intake  Outcome: Progressing  Goal: Optimal Pain Control and Function  Outcome: Progressing  Goal: Skin Health and Integrity  Outcome: Progressing  Goal: Optimal Wound Healing  Outcome: Progressing     Problem: Postpartum ( Delivery)  Goal: Successful Parent Role Transition  Outcome: Progressing  Goal: Hemostasis  Outcome: Progressing  Goal: Effective Bowel Elimination  Outcome: Progressing  Goal: Fluid and Electrolyte Balance  Outcome: Progressing  Goal: Absence of Infection Signs and Symptoms  Outcome: Progressing  Goal: Anesthesia/Sedation Recovery  Outcome: Progressing  Goal: Optimal Pain Control and Function  Outcome: Progressing  Goal: Nausea and Vomiting Relief  Outcome: Progressing  Goal: Effective Urinary Elimination  Outcome: Progressing  Goal: Effective " Oxygenation and Ventilation  Outcome: Progressing

## 2024-11-25 LAB — RBCS: NORMAL

## 2024-12-05 ENCOUNTER — HOSPITAL ENCOUNTER (EMERGENCY)
Facility: HOSPITAL | Age: 29
Discharge: HOME | End: 2024-12-05
Attending: OBSTETRICS & GYNECOLOGY
Payer: MEDICAID

## 2024-12-05 VITALS
HEART RATE: 83 BPM | SYSTOLIC BLOOD PRESSURE: 138 MMHG | WEIGHT: 193 LBS | DIASTOLIC BLOOD PRESSURE: 81 MMHG | HEIGHT: 60 IN | BODY MASS INDEX: 37.89 KG/M2 | OXYGEN SATURATION: 98 % | RESPIRATION RATE: 18 BRPM | TEMPERATURE: 98 F

## 2024-12-05 DIAGNOSIS — T81.49XA WOUND CELLULITIS AFTER SURGERY: Primary | ICD-10-CM

## 2024-12-05 DIAGNOSIS — L92.9 GRANULATION OF WOUND BED: ICD-10-CM

## 2024-12-05 PROCEDURE — 63600175 PHARM REV CODE 636 W HCPCS: Performed by: OBSTETRICS & GYNECOLOGY

## 2024-12-05 PROCEDURE — 99284 EMERGENCY DEPT VISIT MOD MDM: CPT | Mod: 25

## 2024-12-05 PROCEDURE — 96372 THER/PROPH/DIAG INJ SC/IM: CPT | Performed by: OBSTETRICS & GYNECOLOGY

## 2024-12-05 RX ORDER — CEPHALEXIN 500 MG/1
500 CAPSULE ORAL EVERY 6 HOURS
Qty: 28 CAPSULE | Refills: 0 | Status: SHIPPED | OUTPATIENT
Start: 2024-12-05 | End: 2024-12-12

## 2024-12-05 RX ADMIN — LIDOCAINE HYDROCHLORIDE 1 G: 10 INJECTION, SOLUTION INFILTRATION; PERINEURAL at 11:12

## 2024-12-06 NOTE — DISCHARGE INSTRUCTIONS
Call primary OBGYN to inform of reasoning for visit to the [facility].  If you have any further concerns or emergencies, report to the [facility] for further evaluation.

## 2024-12-06 NOTE — ED PROVIDER NOTES
MICHAEL NOTE     Admit Date: 2024  MICHAEL Physician: Pietro Handley  Primary OBGYN:Dr. Eugene Marcano    Admit Diagnosis/Chief Complaint:  Wound drainage      Chief Complaint   Patient presents with    Wound Check     Pt is 2 weeks PP with complaints of wound possibly opening and white/yellow drainage.          HPI:  Alexa Painting is a 29 y.o.  at 39w0d presents complaining of drainage from her  wound for the last 2 days.  The Steri-Strips have fallen off and the Dermabond glue has peeled up.  She is currently on postoperative day number 14 from repeat  section # 4.  She denies any fever or chills.  She denies seeing any blood coming from the wound.  She does notes that it is quite moist.          PMHx:   Past Medical History:   Diagnosis Date    Abnormal Pap smear of cervix     Anemia, unspecified     HSV infection        PSHx:   Past Surgical History:   Procedure Laterality Date     SECTION       SECTION N/A 2024    Procedure:  SECTION;  Surgeon: Eugene Marcano Jr., MD;  Location: [facility] L&D;  Service: OB/GYN;  Laterality: N/A;    COLONOSCOPY N/A 2022    Procedure: COLON;  Surgeon: TRACEY Kuhn MD;  Location: [facility] ENDOSCOPY;  Service: Gastroenterology;  Laterality: N/A;    ESOPHAGOGASTRODUODENOSCOPY N/A 2022    Procedure: EGD (ESOPHAGOGASTRODUODENOSCOPY);  Surgeon: TRACEY Kuhn MD;  Location: [facility] ENDOSCOPY;  Service: Gastroenterology;  Laterality: N/A;    INTRALUMINAL GASTROINTESTINAL TRACT IMAGING VIA CAPSULE N/A 2022    Procedure: IMAGING PROCEDURE, GI TRACT, INTRALUMINAL, VIA CAPSULE;  Surgeon: TRACEY Kuhn MD;  Location: [facility] ENDOSCOPY;  Service: Gastroenterology;  Laterality: N/A;       All: Review of patient's allergies indicates:  No Known Allergies    Meds:   Current Facility-Administered Medications:     cefTRIAXone (Rocephin) 1 g in LIDOcaine HCL 10 mg/ml (1%) 4 mL IM only  syringe, 1 g, Intramuscular, Once, Pietro Handley, DO    Current Outpatient Medications:     ferrous gluconate (FERGON) 324 MG tablet, Take 1 tablet by mouth once daily., Disp: , Rfl:     ibuprofen (ADVIL,MOTRIN) 800 MG tablet, Take 1 tablet (800 mg total) by mouth every 6 (six) hours., Disp: 30 tablet, Rfl: 0    ibuprofen (ADVIL,MOTRIN) 800 MG tablet, Take 1 tablet (800 mg total) by mouth 3 (three) times daily., Disp: 30 tablet, Rfl: 0    oxyCODONE-acetaminophen (PERCOCET)  mg per tablet, Take 1 tablet by mouth every 6 (six) hours as needed (moderate to severe (pain score 3-10), once tolerating oral diet and no longer receiving ofirmev)., Disp: 28 tablet, Rfl: 0    SH:   Social History     Socioeconomic History    Marital status: Single   Tobacco Use    Smoking status: Never    Smokeless tobacco: Never   Substance and Sexual Activity    Alcohol use: Not Currently     Comment: occasionally    Drug use: Never    Sexual activity: Yes       FH: No family history on file.    OBHx:   OB History    Para Term  AB Living   4 4 4 0 0 1   SAB IAB Ectopic Multiple Live Births   0 0 0 0 1      # Outcome Date GA Lbr Rico/2nd Weight Sex Type Anes PTL Lv   4 Term 24 39w0d  3.714 kg (8 lb 3 oz) F , V Spinal  KIRIT      Name: Juan Gonzales      Apgar1: 8  Apgar5: 9   3 Term      CS-LTranv      2 Term      CS-LTranv      1 Term      CS-LTranv          Patient denies contractions, headache, vision changes, RUQ pain, dysuria, fever, and nausea/vomiting.      /81 (BP Location: Right forearm)   Pulse 83   Temp 98.1 °F (36.7 °C) (Oral)   Resp 18   Ht 5' (1.524 m)   Wt 87.5 kg (193 lb)   LMP 2024   SpO2 98%   Breastfeeding No   BMI 37.69 kg/m²   Temp:  [98.1 °F (36.7 °C)] 98.1 °F (36.7 °C)  Pulse:  [83] 83  Resp:  [18] 18  SpO2:  [98 %] 98 %  BP: (138)/(81) 138/81    General: in no apparent distress well developed and well nourished non-toxic  alert  Cardiovascular: Regular rate and rhythm  Lungs: Clear to auscultation bilaterally  Abdominal: soft, nontender, nondistended, no abnormal masses, no epigastric pain obese skin scar:  Pfannenstiel skin incision with granulation tissue and mild cellulitis.  There is no separation of the wound.  No opening no evidence of hematoma or seroma.  There is residual pieces of Dermabond glue on the superior aspect.  It appears that a keloid may be forming.  Review of the operative report notes that the primary surgeon used Ensorb staples.  This could be a possible reaction to the staples.  Back: lumbar tenderness absent   CVA tenderness none  Extremeties no redness or tenderness in the calves or thighs no edema    Pelvic: Deferred        Prenatal labs:  B and Rh positive    LABS:   No results found for this or any previous visit (from the past 24 hours).      Imaging Results    None          ASSESMENT: Alexa Painting is a 29 y.o.   at 39w0d with wound cellulitis and wound granuloma formation.  Stable vital signs.  Observation in MICHAEL and 1 g Rocephin IV given.  Cephalexin will be sent to the pharmacy on file.          Discharge Diagnosis/Clinical Impression:  :  Wound cellulitis after surgery (Primary)  Granulation of wound bed  Postpartum state    Status:Stable    Labor precautions reviewed with patient  ER precautions reviewed with patient  Patient was given an opportunity to ask questions      Patient Instructions:        Medication List        START taking these medications      cephALEXin 500 MG capsule  Commonly known as: KEFLEX  Take 1 capsule (500 mg total) by mouth every 6 (six) hours. for 7 days            CONTINUE taking these medications      ferrous gluconate 324 MG tablet  Commonly known as: FERGON     * ibuprofen 800 MG tablet  Commonly known as: ADVIL,MOTRIN  Take 1 tablet (800 mg total) by mouth every 6 (six) hours.     * ibuprofen 800 MG tablet  Commonly known as: ADVIL,MOTRIN  Take 1 tablet  (800 mg total) by mouth 3 (three) times daily.     oxyCODONE-acetaminophen  mg per tablet  Commonly known as: PERCOCET  Take 1 tablet by mouth every 6 (six) hours as needed (moderate to severe (pain score 3-10), once tolerating oral diet and no longer receiving ofirmev).           * This list has 2 medication(s) that are the same as other medications prescribed for you. Read the directions carefully, and ask your doctor or other care provider to review them with you.                   Where to Get Your Medications        These medications were sent to [id number] - [place] - [address]  [address] [address]      Phone: [phone]   cephALEXin 500 MG capsule          - Pt was given routine pregnancy instructions including to return to triage if she had any vaginal bleeding (other than spotting for the next 48hrs), any loss of fluid like her water broke, decreased fetal movement, or contractions Q 5min lasting for 2 or more hours. Pt was also instructed to drink copious water. Patient voiced understanding of all these instructions and was subsequently discharged home.  Please see specific patient discharge instructions for her current diagnosis.    She will follow up with her primary OB on Monday.  Patient will have to call the office Monday morning to arrange this follow up.      This note was created with the assistance of [employer] voice recognition software. There may be transcription errors as a result of using this technology however minimal. Effort has been made to assure accuracy of transcription but any obvious errors or omissions should be clarified with the author of the document.

## (undated) DEVICE — TUBING O2 FEMALE CONN 13FT

## (undated) DEVICE — TIP SUCTION YANKAUER

## (undated) DEVICE — BULB SYRINGE EAR IRRIGATION

## (undated) DEVICE — CUP TENDERTOUCH ULTRA SIL 60MM

## (undated) DEVICE — CAP BABY BEANIE

## (undated) DEVICE — PENCIL SMOKE EVAC TELSCP 15FT

## (undated) DEVICE — STAPLER SKIN SUBCUTICULAR

## (undated) DEVICE — TRAY CATH 1-LYR URIMTR 16FR

## (undated) DEVICE — SEE MEDLINE ITEM 157117

## (undated) DEVICE — COLLECTION SPECIMEN NEPTUNE

## (undated) DEVICE — SYS CLSR DERMABOND PRINEO 22CM

## (undated) DEVICE — PAD UNDERPAD 30X30

## (undated) DEVICE — PILLCAM SB3 EX EXTENDED

## (undated) DEVICE — SOL NACL IRR 1000ML BTL

## (undated) DEVICE — SOL WATER STRL IRR 1000ML

## (undated) DEVICE — FORCEP ALLIGATOR 2.8MM W/NDL

## (undated) DEVICE — MATTRESS HOVERMAT TRNSF 34X78

## (undated) DEVICE — ADAPTER DUAL NSL LUER M-M 7FT

## (undated) DEVICE — BLOCK BLOX BITE DENT RIM 54FR

## (undated) DEVICE — SEE MEDLINE ITEM 156931

## (undated) DEVICE — UNDERPAD DISPOSABLE 30X30IN

## (undated) DEVICE — SOL IRRI STRL WATER 1000ML

## (undated) DEVICE — PAD SANITARY OB STERILE

## (undated) DEVICE — SUT 3/0 36IN COATED VICRYL

## (undated) DEVICE — KIT CANIST SUCTION 1200CC

## (undated) DEVICE — DRESSING ANTIMIC ISLAND 4X10IN

## (undated) DEVICE — CONTAINER SPECIMEN SCREW 4OZ

## (undated) DEVICE — BAG LABGUARD BIOHAZARD 6X9IN

## (undated) DEVICE — SUT VICRYL 3-0 OB 36 CT-1

## (undated) DEVICE — BINDER ABDOM 4PANEL 12IN LG/XL

## (undated) DEVICE — SUT MONOCRYL 4-0 PS-1 UND

## (undated) DEVICE — SUT CHROMIC GUT 2-0 CT-1 27IN

## (undated) DEVICE — ELECTRODE REM PLYHSV RETURN 9

## (undated) DEVICE — KIT SURGICAL COLON .25 1.1OZ

## (undated) DEVICE — SET FLUID WARMER RANGER

## (undated) DEVICE — SUT 1 36IN CHROMIC GUT CTX

## (undated) DEVICE — SUT 2/0 27IN PLAIN GUT CT